# Patient Record
Sex: FEMALE | Race: WHITE
[De-identification: names, ages, dates, MRNs, and addresses within clinical notes are randomized per-mention and may not be internally consistent; named-entity substitution may affect disease eponyms.]

---

## 2017-02-23 ENCOUNTER — HOSPITAL ENCOUNTER (EMERGENCY)
Dept: HOSPITAL 62 - ER | Age: 32
Discharge: HOME | End: 2017-02-23
Payer: MEDICAID

## 2017-02-23 VITALS — SYSTOLIC BLOOD PRESSURE: 117 MMHG | DIASTOLIC BLOOD PRESSURE: 50 MMHG

## 2017-02-23 DIAGNOSIS — M54.5: ICD-10-CM

## 2017-02-23 DIAGNOSIS — R10.12: ICD-10-CM

## 2017-02-23 DIAGNOSIS — R11.0: ICD-10-CM

## 2017-02-23 DIAGNOSIS — K75.9: Primary | ICD-10-CM

## 2017-02-23 DIAGNOSIS — R63.0: ICD-10-CM

## 2017-02-23 DIAGNOSIS — R19.7: ICD-10-CM

## 2017-02-23 LAB
ALBUMIN SERPL-MCNC: 4 G/DL (ref 3.5–5)
ALP SERPL-CCNC: 221 U/L (ref 38–126)
ALT SERPL-CCNC: 1459 U/L (ref 9–52)
ANION GAP SERPL CALC-SCNC: 11 MMOL/L (ref 5–19)
APPEARANCE UR: (no result)
AST SERPL-CCNC: 1276 U/L (ref 14–36)
BASOPHILS # BLD AUTO: 0 10^3/UL (ref 0–0.2)
BASOPHILS NFR BLD AUTO: 0.7 % (ref 0–2)
BILIRUB DIRECT SERPL-MCNC: 0 MG/DL (ref 0–0.3)
BILIRUB SERPL-MCNC: 1.6 MG/DL (ref 0.2–1.3)
BILIRUB UR QL STRIP: (no result)
BUN SERPL-MCNC: 12 MG/DL (ref 7–20)
CALCIUM: 9.7 MG/DL (ref 8.4–10.2)
CHLORIDE SERPL-SCNC: 102 MMOL/L (ref 98–107)
CO2 SERPL-SCNC: 29 MMOL/L (ref 22–30)
CREAT SERPL-MCNC: 0.6 MG/DL (ref 0.52–1.25)
EOSINOPHIL # BLD AUTO: 0.2 10^3/UL (ref 0–0.6)
EOSINOPHIL NFR BLD AUTO: 3.7 % (ref 0–6)
ERYTHROCYTE [DISTWIDTH] IN BLOOD BY AUTOMATED COUNT: 14.9 % (ref 11.5–14)
GLUCOSE SERPL-MCNC: 159 MG/DL (ref 75–110)
GLUCOSE UR STRIP-MCNC: NEGATIVE MG/DL
HCT VFR BLD CALC: 39.3 % (ref 36–47)
HGB BLD-MCNC: 13.1 G/DL (ref 12–15.5)
HGB HCT DIFFERENCE: 0
KETONES UR STRIP-MCNC: NEGATIVE MG/DL
LIPASE SERPL-CCNC: 67.4 U/L (ref 23–300)
LYMPHOCYTES # BLD AUTO: 1 10^3/UL (ref 0.5–4.7)
LYMPHOCYTES NFR BLD AUTO: 23.9 % (ref 13–45)
MCH RBC QN AUTO: 27.4 PG (ref 27–33.4)
MCHC RBC AUTO-ENTMCNC: 33.3 G/DL (ref 32–36)
MCV RBC AUTO: 82 FL (ref 80–97)
MONOCYTES # BLD AUTO: 0.4 10^3/UL (ref 0.1–1.4)
MONOCYTES NFR BLD AUTO: 9.3 % (ref 3–13)
NEUTROPHILS # BLD AUTO: 2.5 10^3/UL (ref 1.7–8.2)
NEUTS SEG NFR BLD AUTO: 62.4 % (ref 42–78)
NITRITE UR QL STRIP: NEGATIVE
PH UR STRIP: 5 [PH] (ref 5–9)
POTASSIUM SERPL-SCNC: 3.7 MMOL/L (ref 3.6–5)
PROT SERPL-MCNC: 7.4 G/DL (ref 6.3–8.2)
PROT UR STRIP-MCNC: 30 MG/DL
RBC # BLD AUTO: 4.79 10^6/UL (ref 3.72–5.28)
SODIUM SERPL-SCNC: 141.6 MMOL/L (ref 137–145)
SP GR UR STRIP: 1.03
UROBILINOGEN UR-MCNC: 4 MG/DL (ref ?–2)
WBC # BLD AUTO: 4.1 10^3/UL (ref 4–10.5)

## 2017-02-23 PROCEDURE — 96372 THER/PROPH/DIAG INJ SC/IM: CPT

## 2017-02-23 PROCEDURE — 81001 URINALYSIS AUTO W/SCOPE: CPT

## 2017-02-23 PROCEDURE — 80074 ACUTE HEPATITIS PANEL: CPT

## 2017-02-23 PROCEDURE — 76705 ECHO EXAM OF ABDOMEN: CPT

## 2017-02-23 PROCEDURE — 85025 COMPLETE CBC W/AUTO DIFF WBC: CPT

## 2017-02-23 PROCEDURE — 36415 COLL VENOUS BLD VENIPUNCTURE: CPT

## 2017-02-23 PROCEDURE — 99284 EMERGENCY DEPT VISIT MOD MDM: CPT

## 2017-02-23 PROCEDURE — S0119 ONDANSETRON 4 MG: HCPCS

## 2017-02-23 PROCEDURE — 84703 CHORIONIC GONADOTROPIN ASSAY: CPT

## 2017-02-23 PROCEDURE — 83690 ASSAY OF LIPASE: CPT

## 2017-02-23 PROCEDURE — 80053 COMPREHEN METABOLIC PANEL: CPT

## 2017-02-23 NOTE — ER DOCUMENT REPORT
ED GI/





- General


Chief Complaint: Abdominal Pain


Stated Complaint: ABDOMINAL PAIN


Mode of Arrival: Ambulatory


Notes: 


Patient says that she's been experiencing back pains for the past 3-4 days.  It'

s more on the left side and comes around to the left upper quadrant.  Has been 

nauseated but not vomiting.  Has had diarrhea.   Has generalized body aches.  

Has had decreased appetite and decreased oral intake.  Urine output has also 

been decreased.  Has noticed the urine appears to be somewhat darker in color 

than normal.  Think she's had a fever as well.





Patient's LMP just finished and was only 2 days in duration when it's usually 

longer.  On no medications.  No recent illnesses or fevers.  Does not drink 

alcohol.  No surgeries.


TRAVEL OUTSIDE OF THE [place] IN LAST 30 DAYS: No





- Related Data


Allergies/Adverse Reactions: 


 





No Known Allergies Allergy (Verified 02/23/17 11:38)


 











Past Medical History





- General


Information source: Patient





- Social History


Smoking Status: Never Smoker


Chew tobacco use (# tins/day): No


Frequency of alcohol use: None


Drug Abuse: None


Family History: Reviewed & Not Pertinent, DM, Hypertension


Patient has suicidal ideation: No


Patient has homicidal ideation: No


Skin Medical History: Reports Hx MRSA - UNSURE, RECENT LABIA I & D





- Immunizations


Immunizations up to date: Yes


Hx Diphtheria, Pertussis, Tetanus Vaccination: No





Review of Systems





- Review of Systems


Notes: 


REVIEW OF SYSTEMS:


CONSTITUTIONAL : Has felt she had a fever.


EENT:   Denies eye, ear, nose or mouth or throat pain or other symptoms.


CARDIOVASCULAR:  Denies chest pain.


RESPIRATORY:  Denies cough, chest congestion, or shortness of breath.


GASTROINTESTINAL: See history of present illness.


GENITOURINARY:  Denies difficulty or painful urinating, urinary frequency, 

blood in urine.  Noted decreased urine output and darker urine specimen.


MUSCULOSKELETAL:  Denies neck pain.  Denies joint pain or swelling.


SKIN:   Denies rash or skin lesions.


NEUROLOGICAL:  Denies LOC or altered mental status.  Denies headache.  Denies 

sensory loss or motor deficits.





ALL OTHER SYSTEMS REVIEWED AND NEGATIVE.





Physical Exam





- Vital signs


Vitals: 


 











Temp Pulse Resp BP Pulse Ox


 


 97.9 F   65   20   106/61   99 


 


 02/23/17 11:17  02/23/17 11:17  02/23/17 11:17  02/23/17 11:17  02/23/17 11:17











Interpretation: Normal





- Notes


Notes: 


PHYSICAL EXAMINATION:





GENERAL: Well-appearing, in no acute distress.  Vital signs are all normal.





HEAD: Atraumatic, normocephalic.





EYES: Pupils equal round and reactive to light, extraocular movements intact.  

No scleral icterus.





ENT: oropharynx clear without exudates.  Moist mucous membranes.





NECK: Normal range of motion, supple.





LUNGS: Breath sounds clear and equal bilaterally.





HEART: Regular rate and rhythm without murmurs.





ABDOMEN: Soft, nontender.  No guarding or rebound.  No tenderness in the right 

upper quadrant.  Mild tenderness in the left upper quadrant.  No masses felt.





BACK:  No tenderness throughout entire back.





EXTREMITIES: Normal range of motion without pain.





NEUROLOGICAL:  Normal speech, normal gait.  Normal sensory, motor, and reflex 

exams.  Awake, alert, and oriented x3. 





PSYCH: Normal mood, normal affect.





SKIN: Warm, dry, no rashes.





Course





- Re-evaluation


Re-evalutation: 





02/23/17 17:27


Discussed the case with Dr. Rubio and he agrees to follow-up the patient in his 

office next week.  Likely diagnosis is viral hepatitis.





- Vital Signs


Vital signs: 


 











Temp Pulse Resp BP Pulse Ox


 


 98.5 F   59 L  16   117/50 L  98 


 


 02/23/17 15:52  02/23/17 15:52  02/23/17 15:52  02/23/17 15:52  02/23/17 15:52














- Laboratory


Result Diagrams: 


 02/23/17 11:55





 02/23/17 11:55


Laboratory results interpreted by me: 


 











  02/23/17 02/23/17 02/23/17





  11:55 11:55 11:55


 


RDW  14.9 H  


 


Glucose   159 H 


 


Total Bilirubin   1.6 H 


 


AST   1276 H 


 


ALT   1459 H 


 


Alkaline Phosphatase   221 H 


 


Urine Protein    30 H


 


Urine Bilirubin    SMALL H


 


Urine Urobilinogen    4.0 H














- Diagnostic Test


Radiology reviewed: Image reviewed, Reports reviewed - Ultrasound does not show 

any evidence of gallbladder disease.  Patient's liver is described as 

suggestive of hepatitis.





Discharge





- Discharge


Clinical Impression: 


 Hepatitis





Back pain


Qualifiers:


 Back pain location: low back pain Chronicity: acute Back pain laterality: 

bilateral Sciatica presence: unspecified whether sciatica present Qualified Code

(s): M54.5 - Low back pain





Condition: Stable


Disposition: HOME, SELF-CARE


Additional Instructions: 


Flank Pain


     We weren't able to prove an exact cause for your flank pain. Pain in the 

flank can be caused by a muscle strain or spasm. Sometimes a kidney stone 

causes pain, but can't be found on our tests. Infection in the kidney should be 

evident on a urine test. Early shingles can occasionally cause flank pain, 

without the rash that proves the diagnosis. On rare occasions, disease of the 

pancreas, aorta, spleen, or colon can create pain in the flank.


     At this time, there's no evidence of a dangerous condition, and it seems 

safe for you to be at home. If the pain goes away and does not come back, no 

further testing will be needed. If pain persists, or becomes more severe, we 

may need to repeat some tests or order additional new testing.


     Blood in the urine, urgency to urinate frequently, and pain that radiates 

to the groin can indicate a kidney stone. Fever may mean that the pain is due 

to infection, either of the kidney or the colon (diverticulitis). If your pain 

is early shingles, you should develop an eruption of blisters in the painful 

area within a few days. 


     Call the doctor or return if you have pain that is spreading or becoming 

more severe, pain that does not resolve with time, fever, or any other new 

symptoms.





ABDOMINAL PAIN


     There are many causes of abdominal pain.  Pain can mean a serious problem 

requiring surgery (such as appendicitis). It can also be an innocent problem 

that goes away on its own (such as a viral infection).  Often, time must pass 

to determine the cause of pain.


     The physician does not feel that hospitalization is necessary, at present. 

Things may change within the next 24 hours. Call the doctor or come back for re-

examination if any problems occur, such as:


     (1) Pain that becomes more severe, steady, or becomes concentrated in one 

specific area.  Also, pain that is more severe with movement or coughing.  


     (2) Vomiting that persists or becomes more frequent.  


     (3) Blood in the vomitus, urine, or bowel movements.  Blood in the stool 

may have a tarry or black appearance.


     (4) Shaking chills or fever greater than 100 degrees F. 


     (5) The abdomen becomes more distended or swollen. 


     (6) Bowel movements cease. 


     (7) Failure to improve as expected.





Hepatitis


     You likely have hepatitis.  Hepatitis is an inflammation of the liver, 

usually caused by a virus. Usually there is a week or so of malaise, fatigue, 

aches, lack of appetite, and headache, followed by nausea and vomiting, dark 

urine, and jaundice.


     Cases of hepatitis A, B, or C are usually reported to the health 

department.  Tests will determine what type of hepatitis you have:


     (1) Hepatitis B:  This type of hepatitis is not highly contagious. It is 

usually transmitted by needles or sexual contact.  The length of the illness is 

longer (a couple of months), and the chance of long-term complications is 

greater.  Special immune globulin is available for sexual partners or others 

exposed to blood or body fluids.


     (2) Hepatitis A (infectious hepatitis):  Hepatitis A is more contagious, 

but has a shorter length of illness (2-4 weeks).  Close personal contacts may 

need gamma globulin shots to prevent hepatitis.


     (3) Hepatitis C:  This type of hepatitis is passed by blood products or 

sexual contact.  The initial illness is less severe, but there is a good chance 

the hepatitis will become chronic.


     (4) Non-specific hepatitis:  Other viruses can cause hepatitis. These 

cases of hepatitis usually go away without permanent damage.


     There is no cure for hepatitis.  While you are ill, you may receive 

medication to make you more comfortable.  Do not drink alcohol, and don't take 

any drug or medication not approved by your doctor. Rest and try to eat a 

healthy diet. Use good handwashing so you don't spread the virus. Shared toys 

should be cleaned with disinfectant. Clean the toilets, sinks, and counter 

surfaces in bathrooms. Launder clothing in hot water. 


     Return or call the doctor if you develop increasing abdominal pain, severe 

vomiting, dehydration, severe weakness, or confusion.





TORADOL INJECTION:


     You have been given an injection of ketorolac tromethamine (Toradol).  

This is an excellent, safe drug for pain control.  It also has potent 

antiinflammatory action.  You should have significant pain relief within about 

one hour.


     Toradol is not addicting and is non-sedating.  It does not interfere with 

driving or work.


     Call or return if you develop itching, hives, shortness of breath, or rash.





[place] MEDICATION:


     You have been given a medication to suppress nausea and vomiting. This 

type of medication can be given as a shot, pill, or suppository. It will 

usually last for many hours.  Pills and shots usually last six to eight hours, 

suppositories last about 12 hours.  For the typical illness, only one or two 

doses of the medication may be necessary.


     Mild lightheadedness may occur.  This type of medicine can cause 

drowsiness.  Do not drive or operate dangerous machinery while under its 

influence.  Do not mix with alcohol.


     See your doctor at once if you have muscle spasms or tightness, or 

uncontrollable motions (particularly of the neck, mouth, or jaw). Persistent 

vomiting or severe lightheadedness should also be evaluated by the physician.








ORAL NARCOTIC MEDICATION:


     You have been given a prescription for pain control.  This medication is a 

narcotic.  It's best taken with food, as nausea can result if taken on an empty 

stomach.


     Don't operate machinery or drive within six hours of taking this 

medication.  Do not combine this medicine with alcohol, or with any medication 

which can cause sedation (such as cold tablets or sleeping pills) unless you 

get permission from the physician.


     Narcotics tend to cause constipation.  If possible, drink plenty of fluids 

and eat a diet high in fiber and fruits.








FOLLOW-UP CARE:


If you have been referred to a physician for follow-up care, call the physician

s office for an appointment as you were instructed or within the next two days.

  If you experience worsening or a significant change in your symptoms, notify 

the physician immediately or return to the Emergency Department at any time for 

re-evaluation.





I have spoken with Dr. Rubio, local gastroenterologist, and his office will 

contact you to schedule a follow-up appointment next week to recheck your liver 

function test and follow your case.





Prescriptions: 


Oxycodone HCl 5 - 10 mg PO Q4HP PRN #20 tablet


 PRN Reason: 


Promethazine HCl [Phenergan 25 mg Tablet] 1 - 2 tab PO Q6H PRN #15 tablet


 PRN Reason: 


Forms:  Return to Work


Referrals: 


BIJAN RUBIO MD [ACTIVE STAFF] - Follow up in 1 week

## 2017-02-23 NOTE — ER DOCUMENT REPORT
ED Medical Screen (RME)





- General


Chief Complaint: Abdominal Pain


Stated Complaint: ABDOMINAL PAIN


Mode of Arrival: Ambulatory


Information source: Patient


Notes: 


32 y/o F presents to ED c/o nausea, generalized body aches, and bilateral abd 

pain over the last 3 days. Reports associated loose stools. Denies fever, blood 

in stool or emesis.





I have greeted and performed a rapid initial assessment of this patient. A 


comprehensive ED assessment and evaluation of the patient, analysis of test 


results and completion of the medical decision making process will be conducted 


by additional ED providers. 





TRAVEL OUTSIDE OF THE [place] IN LAST 30 DAYS: No





- Related Data


Allergies/Adverse Reactions: 


 





No Known Allergies Allergy (Verified 02/23/17 11:38)


 











Past Medical History


Skin Medical History: Reports Hx MRSA - UNSURE, RECENT LABIA I & D





- Immunizations


Immunizations up to date: Yes


Hx Diphtheria, Pertussis, Tetanus Vaccination: No





Physical Exam





- Vital signs


Vitals: 





 











Temp Pulse Resp BP Pulse Ox


 


 97.9 F   65   20   106/61   99 


 


 02/23/17 11:17  02/23/17 11:17  02/23/17 11:17  02/23/17 11:17  02/23/17 11:17














- General


General appearance: Appears well, Alert


In distress: None





- Respiratory


Respiratory status: No respiratory distress





- Cardiovascular


Rhythm: Regular


Pulses: Normal: Radial


Normal capillary refill: Yes





Course





- Vital Signs


Vital signs: 





 











Temp Pulse Resp BP Pulse Ox


 


 97.9 F   65   20   106/61   99 


 


 02/23/17 11:17  02/23/17 11:17  02/23/17 11:17  02/23/17 11:17  02/23/17 11:17

## 2017-05-28 ENCOUNTER — HOSPITAL ENCOUNTER (EMERGENCY)
Dept: HOSPITAL 62 - ER | Age: 32
LOS: 1 days | Discharge: HOME | End: 2017-05-29
Payer: MEDICAID

## 2017-05-28 VITALS — SYSTOLIC BLOOD PRESSURE: 114 MMHG | DIASTOLIC BLOOD PRESSURE: 70 MMHG

## 2017-05-28 DIAGNOSIS — F17.200: ICD-10-CM

## 2017-05-28 DIAGNOSIS — T40.1X1A: Primary | ICD-10-CM

## 2017-05-28 LAB
ALBUMIN SERPL-MCNC: 4.1 G/DL (ref 3.5–5)
ALP SERPL-CCNC: 72 U/L (ref 38–126)
ALT SERPL-CCNC: 38 U/L (ref 9–52)
ANION GAP SERPL CALC-SCNC: 7 MMOL/L (ref 5–19)
AST SERPL-CCNC: 39 U/L (ref 14–36)
BASOPHILS # BLD AUTO: 0.1 10^3/UL (ref 0–0.2)
BASOPHILS NFR BLD AUTO: 0.7 % (ref 0–2)
BILIRUB DIRECT SERPL-MCNC: 0.4 MG/DL (ref 0–0.4)
BILIRUB SERPL-MCNC: 1 MG/DL (ref 0.2–1.3)
BUN SERPL-MCNC: 9 MG/DL (ref 7–20)
CALCIUM: 9.5 MG/DL (ref 8.4–10.2)
CHLORIDE SERPL-SCNC: 107 MMOL/L (ref 98–107)
CO2 SERPL-SCNC: 27 MMOL/L (ref 22–30)
CREAT SERPL-MCNC: 0.7 MG/DL (ref 0.52–1.25)
EOSINOPHIL # BLD AUTO: 0.1 10^3/UL (ref 0–0.6)
EOSINOPHIL NFR BLD AUTO: 1.8 % (ref 0–6)
ERYTHROCYTE [DISTWIDTH] IN BLOOD BY AUTOMATED COUNT: 14.7 % (ref 11.5–14)
ETHANOL SERPL-MCNC: < 10 MG/DL
GLUCOSE SERPL-MCNC: 87 MG/DL (ref 75–110)
HCT VFR BLD CALC: 35.7 % (ref 36–47)
HGB BLD-MCNC: 12 G/DL (ref 12–15.5)
HGB HCT DIFFERENCE: 0.3
LYMPHOCYTES # BLD AUTO: 1.1 10^3/UL (ref 0.5–4.7)
LYMPHOCYTES NFR BLD AUTO: 14.4 % (ref 13–45)
MCH RBC QN AUTO: 28.1 PG (ref 27–33.4)
MCHC RBC AUTO-ENTMCNC: 33.6 G/DL (ref 32–36)
MCV RBC AUTO: 84 FL (ref 80–97)
MONOCYTES # BLD AUTO: 0.5 10^3/UL (ref 0.1–1.4)
MONOCYTES NFR BLD AUTO: 6.4 % (ref 3–13)
NEUTROPHILS # BLD AUTO: 5.8 10^3/UL (ref 1.7–8.2)
NEUTS SEG NFR BLD AUTO: 76.7 % (ref 42–78)
POTASSIUM SERPL-SCNC: 4.1 MMOL/L (ref 3.6–5)
PROT SERPL-MCNC: 7.7 G/DL (ref 6.3–8.2)
RBC # BLD AUTO: 4.26 10^6/UL (ref 3.72–5.28)
SODIUM SERPL-SCNC: 141.1 MMOL/L (ref 137–145)
WBC # BLD AUTO: 7.5 10^3/UL (ref 4–10.5)

## 2017-05-28 PROCEDURE — 36415 COLL VENOUS BLD VENIPUNCTURE: CPT

## 2017-05-28 PROCEDURE — 81001 URINALYSIS AUTO W/SCOPE: CPT

## 2017-05-28 PROCEDURE — 84703 CHORIONIC GONADOTROPIN ASSAY: CPT

## 2017-05-28 PROCEDURE — 93005 ELECTROCARDIOGRAM TRACING: CPT

## 2017-05-28 PROCEDURE — 93010 ELECTROCARDIOGRAM REPORT: CPT

## 2017-05-28 PROCEDURE — 80053 COMPREHEN METABOLIC PANEL: CPT

## 2017-05-28 PROCEDURE — 80307 DRUG TEST PRSMV CHEM ANLYZR: CPT

## 2017-05-28 PROCEDURE — 99285 EMERGENCY DEPT VISIT HI MDM: CPT

## 2017-05-28 PROCEDURE — 85025 COMPLETE CBC W/AUTO DIFF WBC: CPT

## 2017-05-28 NOTE — ER DOCUMENT REPORT
ED Substance Abuse / Acc. OD





- General


Stated Complaint: OD


Time Seen by Provider: 05/28/17 20:56


Mode of Arrival: Medic


Information source: Patient


TRAVEL OUTSIDE OF THE [place] IN LAST 30 DAYS: No





- HPI


Patient complains to provider of: Accidental overdose, Drug abuse


Onset: Just prior to arrival


Onset/Duration: Sudden


Quality of pain: No pain


Overdose of: Other - Heroin


Associated Symptoms: None


Similar symptoms previously: No


Recently seen / treated by doctor: No


Notes: 


Patient is a 31-year-old female who is brought to the emergency room by EMS 

after accidental overdose, states she recreationally uses heroin, she injected 

"2 bags" to her left antecubital space today, and the next thing she knew she 

woke up to her, she feels, she denies being suicidal or homicidal, no history 

of previous overdose in the past, patient has no complaint at time of initial 

evaluation





- Related Data


Allergies/Adverse Reactions: 


 





No Known Allergies Allergy (Verified 02/23/17 11:38)


 











Past Medical History





- General


Information source: Patient





- Social History


Smoking Status: Current Every Day Smoker


Drug Abuse: Heroin, Marijuana


Family History: Reviewed & Not Pertinent, DM, Hypertension


Renal/ Medical History: Denies: Hx Peritoneal Dialysis


Skin Medical History: Reports Hx MRSA - UNSURE, RECENT LABIA I & D





- Immunizations


Immunizations up to date: Yes


Hx Diphtheria, Pertussis, Tetanus Vaccination: No





Review of Systems





- Review of Systems


Constitutional: No symptoms reported


EENT: No symptoms reported


Cardiovascular: No symptoms reported


Respiratory: No symptoms reported


Gastrointestinal: No symptoms reported


Genitourinary: No symptoms reported


Female Genitourinary: No symptoms reported


Musculoskeletal: No symptoms reported


Skin: No symptoms reported


Hematologic/Lymphatic: No symptoms reported


Neurological/Psychological: See HPI


-: Yes All other systems reviewed and negative





Physical Exam





- Vital signs


Vitals: 


 











Resp Pulse Ox


 


 22 H  92 


 


 05/28/17 20:37  05/28/17 20:37











Interpretation: Normal





- General


General appearance: Appears well, Alert





- HEENT


Head: Normocephalic, Atraumatic


Eyes: Normal


Pupils: PERRL





- Respiratory


Respiratory status: No respiratory distress


Chest status: Nontender


Breath sounds: Normal


Chest palpation: Normal





- Cardiovascular


Rhythm: Regular


Heart sounds: Normal auscultation


Murmur: No





- Abdominal


Inspection: Normal


Distension: No distension


Bowel sounds: Normal


Tenderness: Nontender


Organomegaly: No organomegaly





- Back


Back: Normal, Nontender





- Extremities


General upper extremity: Normal inspection, Nontender, Normal color, Normal ROM

, Normal temperature


General lower extremity: Normal inspection, Nontender, Normal color, Normal ROM

, Normal temperature, Normal weight bearing.  No: Janeth's sign





- Neurological


Neuro grossly intact: Yes


Cognition: Normal


Orientation: AAOx4


Colbert Coma Scale Eye Opening: Spontaneous


Jess Coma Scale Verbal: Oriented


Colbert Coma Scale Motor: Obeys Commands


Jess Coma Scale Total: 15


Speech: Normal


Motor strength normal: LUE, RUE, LLE, RLE


Sensory: Normal





- Psychological


Associated symptoms: Normal affect, Normal mood





- Skin


Skin Temperature: Warm


Skin Moisture: Dry


Skin Color: Normal





Course





- Re-evaluation


Re-evalutation: 





05/29/17 03:09


She is sleeping comfortably, easily aroused, vital signs have been stable, she 

continues to deny that this was a suicide attempt, rather it was recreational 

use of heroin, she was unaware that she may have injected too much to causing 

patient has been in the emergency room for nearly 7 hours at this point in time

, she remains stable, and will be discharged with instructions for follow-up, 

advised to stop using heroin or other illegal drugs, follow-up with her primary 

care provider or return if symptoms worsen, patient acknowledges understanding 

and agreement with this plan





- Vital Signs


Vital signs: 


 











Temp Pulse Resp BP Pulse Ox


 


       14   114/70   96 


 


       05/28/17 23:40  05/28/17 20:46  05/28/17 23:40














- Laboratory


Result Diagrams: 


 05/28/17 20:12





 05/28/17 20:12


Laboratory results interpreted by me: 


 











  05/28/17 05/28/17 05/28/17





  20:12 20:12 21:07


 


Hct  35.7 L  


 


RDW  14.7 H  


 


AST   39 H 


 


Urine Protein    100 H


 


Urine Blood    LARGE H


 


Urine Urobilinogen    4.0 H


 


Salicylates   < 1.0 L 


 


Acetaminophen   < 10 L 














- EKG Interpretation by Me


EKG shows normal: Sinus rhythm


Rate: Normal


Rhythm: NSR





Discharge





- Discharge


Clinical Impression: 


Heroin overdose


Qualifiers:


 Encounter type: initial encounter Injury intent: accidental or unintentional 

Qualified Code(s): T40.1X1A - Poisoning by heroin, accidental (unintentional), 

initial encounter





Condition: Stable


Disposition: HOME, SELF-CARE


Instructions:  Overdose ([facility]), Instructions for Home Care Following a Drug 

Overdose ([facility])


Additional Instructions: 


Stop using heroin or other illegal drugs!  Follow-up with your primary care 

provider in 1-2 days or return to the emergency room if symptoms worsen.

## 2017-05-29 LAB
APPEARANCE UR: (no result)
BARBITURATES UR QL SCN: NEGATIVE
BILIRUB UR QL STRIP: NEGATIVE
GLUCOSE UR STRIP-MCNC: NEGATIVE MG/DL
KETONES UR STRIP-MCNC: NEGATIVE MG/DL
METHADONE UR QL SCN: NEGATIVE
NITRITE UR QL STRIP: NEGATIVE
PCP UR QL SCN: NEGATIVE
PH UR STRIP: 8 [PH] (ref 5–9)
PROT UR STRIP-MCNC: 100 MG/DL
SP GR UR STRIP: 1.02
URINE OPIATES LOW: (no result)
UROBILINOGEN UR-MCNC: 4 MG/DL (ref ?–2)

## 2017-05-30 ENCOUNTER — HOSPITAL ENCOUNTER (EMERGENCY)
Dept: HOSPITAL 62 - ER | Age: 32
Discharge: HOME | End: 2017-05-30
Payer: COMMERCIAL

## 2017-05-30 VITALS — SYSTOLIC BLOOD PRESSURE: 118 MMHG | DIASTOLIC BLOOD PRESSURE: 55 MMHG

## 2017-05-30 DIAGNOSIS — F17.210: ICD-10-CM

## 2017-05-30 DIAGNOSIS — F19.10: ICD-10-CM

## 2017-05-30 DIAGNOSIS — F31.9: Primary | ICD-10-CM

## 2017-05-30 DIAGNOSIS — Z86.14: ICD-10-CM

## 2017-05-30 LAB
APPEARANCE UR: CLEAR
BARBITURATES UR QL SCN: NEGATIVE
BILIRUB UR QL STRIP: NEGATIVE
GLUCOSE UR STRIP-MCNC: NEGATIVE MG/DL
KETONES UR STRIP-MCNC: NEGATIVE MG/DL
METHADONE UR QL SCN: NEGATIVE
NITRITE UR QL STRIP: NEGATIVE
PCP UR QL SCN: NEGATIVE
PH UR STRIP: 6 [PH] (ref 5–9)
PROT UR STRIP-MCNC: NEGATIVE MG/DL
SP GR UR STRIP: 1.02
URINE OPIATES LOW: (no result)
UROBILINOGEN UR-MCNC: NEGATIVE MG/DL (ref ?–2)

## 2017-05-30 PROCEDURE — 93005 ELECTROCARDIOGRAM TRACING: CPT

## 2017-05-30 PROCEDURE — 99284 EMERGENCY DEPT VISIT MOD MDM: CPT

## 2017-05-30 PROCEDURE — 93010 ELECTROCARDIOGRAM REPORT: CPT

## 2017-05-30 PROCEDURE — 80307 DRUG TEST PRSMV CHEM ANLYZR: CPT

## 2017-05-30 PROCEDURE — 81001 URINALYSIS AUTO W/SCOPE: CPT
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
## 2017-05-30 NOTE — ER DOCUMENT REPORT
ED General





- General


Chief Complaint: Psych Problem


Stated Complaint: PSYCH EVAL


Time Seen by Provider: 05/30/17 15:33


TRAVEL OUTSIDE OF THE [place] IN LAST 30 DAYS: No





- HPI


Patient complains to provider of: Psychiatric evaluation


Notes: 


Patient is coming in for psychiatric evaluation.  Patient states she is having 

suicidal thoughts and is requesting detox.  Patient recently seen for a 

unintentional heroin overdose.  Patient states that she uses heroin very 

intermittently however has been to various places about detox.  Patient states 

that she was recently seen at [facility] prior to arrival was referred to 

[facility] today encouraged patient started medication to replace [place].  

Patient states she did not want to do this and that she does not use marijuana 

quite often.  Patient states that she is not suicidal and she placed this on 

her paperwork so that she can be seen today quicker-he has some answers that 

she is frustrated feeling that she is getting the run around.  Patient states 

today what would make her happy would go to inpatient detox.





- Related Data


Allergies/Adverse Reactions: 


 





No Known Allergies Allergy (Verified 02/23/17 11:38)


 











Past Medical History





- General


Information source: Patient, Relative - sister is bedside, [facility] Records





- Social History


Smoking Status: Current Every Day Smoker


Cigarette use (# per day): Yes


Chew tobacco use (# tins/day): No


Family History: Reviewed & Not Pertinent, DM, Hypertension


Patient has suicidal ideation: No


Patient has homicidal ideation: No


Renal/ Medical History: Denies: Hx Peritoneal Dialysis


Skin Medical History: Reports Hx MRSA - UNSURE, RECENT LABIA I & D





- Immunizations


Immunizations up to date: Yes


Hx Diphtheria, Pertussis, Tetanus Vaccination: No





Review of Systems





- Review of Systems


Constitutional: No symptoms reported


EENT: No symptoms reported


Cardiovascular: No symptoms reported


Respiratory: No symptoms reported


Gastrointestinal: No symptoms reported


Genitourinary: No symptoms reported


Female Genitourinary: No symptoms reported


Musculoskeletal: No symptoms reported


Skin: No symptoms reported


Hematologic/Lymphatic: No symptoms reported


Neurological/Psychological: Other - Upset requesting detox





Physical Exam





- Vital signs


Vitals: 





 











Temp Pulse Resp BP Pulse Ox


 


 98 F   72   16   119/73   99 


 


 05/30/17 14:28  05/30/17 14:28  05/30/17 14:28  05/30/17 14:28  05/30/17 14:28











Interpretation: Normal





- General


General appearance: Appears well, Alert





- HEENT


Head: Normocephalic, Atraumatic


Eyes: Normal


Pupils: PERRL





- Respiratory


Respiratory status: No respiratory distress


Chest status: Nontender


Breath sounds: Normal


Chest palpation: Normal





- Cardiovascular


Rhythm: Regular


Heart sounds: Normal auscultation


Murmur: No





- Abdominal


Inspection: Normal


Distension: No distension


Bowel sounds: Normal


Tenderness: Nontender


Organomegaly: No organomegaly





- Back


Back: Normal, Nontender





- Extremities


General upper extremity: Normal inspection, Nontender, Normal color, Normal ROM

, Normal temperature


General lower extremity: Normal inspection, Nontender, Normal color, Normal ROM

, Normal temperature, Normal weight bearing.  No: Janeth's sign





- Neurological


Neuro grossly intact: Yes


Cognition: Normal


Orientation: AAOx4


Jess Coma Scale Eye Opening: Spontaneous


Lowell Coma Scale Verbal: Oriented


Jess Coma Scale Motor: Obeys Commands


Jess Coma Scale Total: 15


Speech: Normal


Motor strength normal: LUE, RUE, LLE, RLE


Sensory: Normal





- Psychological


Associated symptoms: Normal affect, Normal mood





- Skin


Skin Temperature: Warm


Skin Moisture: Dry


Skin Color: Normal





Course





- Re-evaluation


Re-evalutation: 





05/30/17 17:17


Patient coming in requesting detox.  Patient denies any homicidal suicidal 

ideation.  Patient states that this was a way that she could be seen quicker.  

Patient was evaluated at mental health team.  Patient was given outpatient 

resources for her drug abuse.  At this time patient meets no criteria for IVC.  

Patient will be discharged home follow-up with primary care and resources were 

provided.





- Vital Signs


Vital signs: 





 











Temp Pulse Resp BP Pulse Ox


 


 97.7 F   69   16   118/55 L  99 


 


 05/30/17 17:09  05/30/17 17:09  05/30/17 14:32  05/30/17 17:09  05/30/17 17:09














Discharge





- Discharge


Clinical Impression: 


 Drug abuse, Bipolar 1 disorder





Condition: Stable


Disposition: HOME, SELF-CARE


Additional Instructions: 


Bipolar Disorder





     Bipolar disorder is also called manic-depressive disorder. Depression 

alternates with brain hyperactivity called driss.  Each phase lasts from 

several days to a few weeks.  We don't know exactly what causes bipolar disorder

, but it's treatable.


     During the "manic phase," you may feel elated and energetic.  You may have 

racing thoughts, rapid speech, increased activity, and grandiose ideas.  During 

this time, you may not realize how poor your judgement is.  Inappropriate 

spending, drug abuse, excessive alcohol use, marriage problems, and 

irresponsible sexual behavior are common during the manic phase.


     During the "depressive phase," you might feel depressed, guilty, worthless

, fatigued, and unable to concentrate.  You might have thoughts of suicide.


     Good treatments are available for bipolar disorder. Lithium is a classic 

drug for bipolar disorder, and is still often useful. If the manic phase is 

very mild, an antidepressant alone can be prescribed.  If the manic phase is 

very severe, an antipsychotic medicine (such as Haldol) may be needed. The 

treatment must be matched to your symptoms, so it's important to work closely 

with your psychiatric care provider.


     Contact your physician, the hospital emergency center, crisis line, or 

your counsellor if you are losing control or having self-destructive thoughts.





Please follow up with your therapist in [place]. Please continue to work with 

[facility] regarding your medications.  Please consider engaging in Narcotics 

Anonymous.  You have been provided a list of resources.  Please return if your 

symptoms worsen.


Forms:  Return to Work


Referrals: 


[facility] [Provider Group] - Follow up as needed

## 2017-10-25 ENCOUNTER — HOSPITAL ENCOUNTER (EMERGENCY)
Dept: HOSPITAL 62 - ER | Age: 32
LOS: 1 days | Discharge: HOME | End: 2017-10-26
Payer: COMMERCIAL

## 2017-10-25 DIAGNOSIS — R51: Primary | ICD-10-CM
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
## 2017-10-25 DIAGNOSIS — M25.531: ICD-10-CM

## 2017-10-25 DIAGNOSIS — M79.602: ICD-10-CM

## 2017-10-25 DIAGNOSIS — M79.601: ICD-10-CM

## 2017-10-25 DIAGNOSIS — M25.532: ICD-10-CM

## 2017-10-25 DIAGNOSIS — R07.9: ICD-10-CM

## 2017-10-25 PROCEDURE — 80307 DRUG TEST PRSMV CHEM ANLYZR: CPT

## 2017-10-25 PROCEDURE — 81025 URINE PREGNANCY TEST: CPT

## 2017-10-25 PROCEDURE — 71020: CPT

## 2017-10-25 PROCEDURE — 70450 CT HEAD/BRAIN W/O DYE: CPT

## 2017-10-25 PROCEDURE — 99284 EMERGENCY DEPT VISIT MOD MDM: CPT

## 2017-10-25 PROCEDURE — 72125 CT NECK SPINE W/O DYE: CPT

## 2017-10-25 PROCEDURE — 81001 URINALYSIS AUTO W/SCOPE: CPT

## 2017-10-26 VITALS — DIASTOLIC BLOOD PRESSURE: 70 MMHG | SYSTOLIC BLOOD PRESSURE: 124 MMHG

## 2017-10-26 LAB
APPEARANCE UR: (no result)
BARBITURATES UR QL SCN: NEGATIVE
BILIRUB UR QL STRIP: NEGATIVE
GLUCOSE UR STRIP-MCNC: NEGATIVE MG/DL
KETONES UR STRIP-MCNC: NEGATIVE MG/DL
METHADONE UR QL SCN: NEGATIVE
NITRITE UR QL STRIP: NEGATIVE
PCP UR QL SCN: NEGATIVE
PH UR STRIP: 5 [PH] (ref 5–9)
PROT UR STRIP-MCNC: 30 MG/DL
SP GR UR STRIP: 1.03
URINE OPIATES LOW: (no result)
UROBILINOGEN UR-MCNC: 2 MG/DL (ref ?–2)
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
## 2018-01-29 ENCOUNTER — HOSPITAL ENCOUNTER (EMERGENCY)
Dept: HOSPITAL 62 - ER | Age: 33
Discharge: HOME | End: 2018-01-29
Payer: SELF-PAY

## 2018-01-29 VITALS — SYSTOLIC BLOOD PRESSURE: 121 MMHG | DIASTOLIC BLOOD PRESSURE: 69 MMHG

## 2018-01-29 DIAGNOSIS — Z79.899: ICD-10-CM

## 2018-01-29 DIAGNOSIS — F41.9: ICD-10-CM

## 2018-01-29 DIAGNOSIS — T88.7XXA: Primary | ICD-10-CM

## 2018-01-29 DIAGNOSIS — Z87.891: ICD-10-CM

## 2018-01-29 PROCEDURE — 99283 EMERGENCY DEPT VISIT LOW MDM: CPT
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
## 2018-09-16 ENCOUNTER — HOSPITAL ENCOUNTER (EMERGENCY)
Dept: HOSPITAL 62 - ER | Age: 33
Discharge: LEFT BEFORE DISCHARGE | End: 2018-09-16
Payer: SELF-PAY

## 2018-09-16 VITALS — SYSTOLIC BLOOD PRESSURE: 110 MMHG | DIASTOLIC BLOOD PRESSURE: 67 MMHG

## 2018-09-16 DIAGNOSIS — Z53.21: Primary | ICD-10-CM

## 2018-12-02 ENCOUNTER — HOSPITAL ENCOUNTER (EMERGENCY)
Dept: HOSPITAL 62 - ER | Age: 33
Discharge: HOME | End: 2018-12-02
Payer: SELF-PAY

## 2018-12-02 VITALS — DIASTOLIC BLOOD PRESSURE: 79 MMHG | SYSTOLIC BLOOD PRESSURE: 122 MMHG

## 2018-12-02 DIAGNOSIS — S16.1XXA: Primary | ICD-10-CM
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
## 2018-12-02 DIAGNOSIS — M43.6: ICD-10-CM

## 2018-12-02 PROCEDURE — 99283 EMERGENCY DEPT VISIT LOW MDM: CPT

## 2018-12-02 NOTE — ER DOCUMENT REPORT
ED Neck/Back Problem





- General


Chief Complaint: Stiff Neck


Stated Complaint: NECK PAIN


Time Seen by Provider: 12/02/18 10:39


Mode of Arrival: Ambulatory


Information source: Patient


Notes: 





33-year-old female presented to ED for stiff neck with pain turning to the 

right side or movement of her neck at all.  She states it radiates down her 

arm.  She states she woke up this way.  She states Tylenol Motrin did not help 

her.  She is alert and oriented respirations regular nonlabored speaking in 

full sentences.  She states she did not injure herself she just woke up this 

way.  Patient did get moderate amount of relief with massage of the muscle.


TRAVEL OUTSIDE OF THE [place] IN LAST 30 DAYS: No





- HPI


Patient complains to provider of: Pain, Neck


Onset: Just prior to arrival


Where: Home


Onset: Sudden - Woke up with pain


Timing: Better


Quality of pain: Sharp


Severity: Severe


Pain Level: 5


Recent injury: No


Associated symptoms: Radiation to arm, Other - Pain to the neck when she woke up


Exacerbated by: Movement of neck


Relieved by: Nothing


Similar symptoms previously: Yes


Recently seen / treated by doctor: No





- Related Data


Allergies/Adverse Reactions: 


 





No Known Allergies Allergy (Verified 01/29/18 15:29)


 











Past Medical History





- General


Information source: Patient





- Social History


Smoking Status: Former Smoker


Chew tobacco use (# tins/day): No


Frequency of alcohol use: None


Drug Abuse: Marijuana


Lives with: Family


Family History: Arthritis, CAD, CVA, DM, Hyperlipidemia, Hypertension, 

Malignancy.  denies: COPD, Thyroid Disfunction


Patient has suicidal ideation: No


Patient has homicidal ideation: No





- Past Medical History


Cardiac Medical History: Reports: None


Pulmonary Medical History: Reports: None


EENT Medical History: Reports: None


Neurological Medical History: Reports: None


Endocrine Medical History: Reports: None


Renal/ Medical History: Reports: None


Malignancy Medical History: Reports: None


GI Medical History: Reports: None


Musculoskeletal Medical History: Reports Hx Musculoskeletal Trauma


Skin Medical History: Reports Hx MRSA - UNSURE, RECENT LABIA I & D


Psychiatric Medical History: Reports: Hx Attention Deficit Hyperactivity 

Disorder, Hx Bipolar Disorder, Hx Borderline Personality Disorder, Hx Depression


Traumatic Medical History: Reports: Hx Fractures - Arm


Infectious Medical History: Reports: None


Surgical Hx: Negative





- Immunizations


Immunizations up to date: Yes


Hx Diphtheria, Pertussis, Tetanus Vaccination: No





Review of Systems





- Review of Systems


Constitutional: No symptoms reported


EENT: No symptoms reported


Cardiovascular: No symptoms reported


Respiratory: No symptoms reported


Gastrointestinal: No symptoms reported


Genitourinary: No symptoms reported


Female Genitourinary: No symptoms reported


Musculoskeletal: Muscle pain, Neck pain


Skin: No symptoms reported


Hematologic/Lymphatic: No symptoms reported


Neurological/Psychological: No symptoms reported





Physical Exam





- Vital signs


Vitals: 





 











Temp Pulse Resp BP Pulse Ox


 


 98.5 F   64   16   118/77   96 


 


 12/02/18 10:25  12/02/18 10:25  12/02/18 10:25  12/02/18 10:25  12/02/18 10:25











Interpretation: Normal





- General


General appearance: Appears well, Alert





- HEENT


Head: Normocephalic, Atraumatic


Eyes: Normal


Pupils: PERRL





- Respiratory


Respiratory status: No respiratory distress


Chest status: Nontender


Breath sounds: Normal


Chest palpation: Normal





- Cardiovascular


Rhythm: Regular


Heart sounds: Normal auscultation


Murmur: No





- Abdominal


Inspection: Normal


Distension: No distension


Bowel sounds: Normal


Tenderness: Nontender


Organomegaly: No organomegaly





- Back


Back: Normal, Tender - Right back of the neck upper shoulder.





- Extremities


General upper extremity: Normal inspection, Nontender, Normal color, Normal ROM

, Normal temperature


General lower extremity: Normal inspection, Nontender, Normal color, Normal ROM

, Normal temperature, Normal weight bearing.  No: Janeth's sign





- Neurological


Neuro grossly intact: Yes


Cognition: Normal


Orientation: AAOx4


Jess Coma Scale Eye Opening: Spontaneous


Jess Coma Scale Verbal: Oriented


Jess Coma Scale Motor: Obeys Commands


[place] Coma Scale Total: 15


Speech: Normal


Motor strength normal: LUE, RUE, LLE, RLE


Sensory: Normal





- Psychological


Associated symptoms: Normal affect, Normal mood





- Skin


Skin Temperature: Warm


Skin Moisture: Dry


Skin Color: Normal





Course





- Re-evaluation


Re-evalutation: 





12/02/18 11:13


Patient had point tenderness to the back of the neck right shoulder area.  When 

massage she stated there was no longer any pain down her arm she was a move her 

neck freely.  She states that was tremendous relief.  Patient was discharged 

home with ibuprofen and Flexeril and discharged home with a prescription for 

Robaxin.  Patient was instructed to have family massage the area that was 

tender and sore.  Patient verbalized agreement with plan.





- Vital Signs


Vital signs: 





 











Temp Pulse Resp BP Pulse Ox


 


 98.5 F   64   16   118/77   96 


 


 12/02/18 10:25  12/02/18 10:25  12/02/18 10:25  12/02/18 10:25  12/02/18 10:25














Discharge





- Discharge


Clinical Impression: 


Cervical strain, acute


Qualifiers:


 Encounter type: initial encounter Qualified Code(s): S16.1XXA - Strain of 

muscle, fascia and tendon at neck level, initial encounter





Condition: Stable


Disposition: HOME, SELF-CARE


Instructions:  [facility] Phys[facility] / Practices, Ibuprofen (General) ([facility])


Additional Instructions: 


NECK INJURY (CERVICAL STRAIN):


     You have a neck strain.  This is an injury to the muscles and ligaments in 

the neck.  There is no evidence of a fracture of the neck bones.  Also, no 

injury to the spinal cord or nerve roots was detected.


     Usually, stiffness and pain INCREASE for the first 24-48 hours after the 

injury.  The pain will gradually resolve and the neck will become more mobile.  

Most patients are back at work or school within a few days.  Typically, 

complete healing takes about two or three weeks.


     The usual initial treatment is rest and cold packs.  A neck collar may be 

placed to keep the muscles of the neck at rest. Antiinflammatory and muscle 

relaxing medication are often used to reduce the spasm and irritation.


     You should call the doctor, or go to the hospital, if you develop numbness 

or weakness in any extremity, problems with your bladder or bowel, or pain 

radiating down the arms.





USE OF TYLENOL (ACETAMINOPHEN):


     Acetaminophen may be taken for pain relief or fever control. It's much 

safer than aspirin, offering a wider range of "safe" dosages.  It is safe 

during pregnancy.  Some brand names are Tylenol, Panadol, Datril, Anacin 3, 

Tempra, and Liquiprin. Acetaminophen can be repeated every four hours.  The 

following are maximum recommended dosages:





WEIGHT         Dose             Drops                  Elixir        Chewable(

80mg)


(LBS.)                            drprs=droppers    tsp=teaspoon


6               40 mg            0.4 ml (1/2)


6-11            80 mg            0.8 ml (full)              tsp               

   1       tab


12-16         120 mg           1 1/2 drprs             3/4  tsp               1 

1/2  tabs


17-23         160 mg             2  drprs             1    tsp                 

  2       tabs


24-30         240 mg             3  drprs             1 1/2 tsp                

3       tabs


30-35         320 mg                                       2    tsp            

       4       tabs


36-41         360 mg                                       2 1/4   tsp         

     4 1/2 tabs


42-47         400 mg                                       2 1/2   tsp         

     5      tabs


48-53         480 mg                                       3    tsp            

       6      tabs


54-59         520 mg                                       3  1/4  tsp         

     6 1/2 tabs


60-64         560 mg                                       3  1/2  tsp         

     7      tabs 


65-70         600 mg                                       3  3/4  tsp         

     7 1/2 tabs


71-76         640 mg                                       4   tsp             

      8      tabs


77-82         720 mg                                       4 1/2   tsp         

    9      tabs


83-88         800 mg                                       5   tsp             

    10      tabs





>89 pounds or adults          650 mg to 900 mg





Acetaminophen can be repeated every four hours.  Maximum dose not to exceed 

4000 mg a day.





   These maximum recommended dosages are slightly higher than the dosages 

written on the product container, but these dosages are very safe and below the 

toxic dosage for acetaminophen.








ICE PACKS:


     Apply ice packs frequently against the painful area.  Many different 

schedules are recommended, such as "20 minutes on, 20 minutes off" or "one hour 

ice, two hours rest."  If you need to work, you may need to go longer between 

ice treatments.  You should plan to have the area ice packed AT LEAST one 

fourth of the time.


     The ice should be applied over the wrap, tape, or splint, or over a layer 

of cloth -- not directly against the skin.  Some ice bags have a built-in cloth 

and can be put directly on the skin.





WARM PACKS:


     After approximately two days, apply gentle heat (such as a heating pad or 

hot water bottle) for about 20 to 30 minutes about every two hours -- at least 

four times daily.  Warmth and elevation will help you make a more rapid recovery

, and will ease the pain considerably.


     Do not use HOT heat, and never apply heat for longer than 30 minutes.  The 

continuous heat can invisibly damage skin and muscles -- even when no burn is 

seen on the surface.  Damaged muscles can make you MORE sore.








MUSCLE RELAXERS: 


     Muscle relaxing medications are usually prescribed for acute muscle spasm 

or injury to the neck and back.  They are often combined with antiinflammatory 

pain medication for increased relief.


     You may stop the muscle relaxer when the pain and stiffness have improved.

  Start the medication again if spasms recur.  


     Muscle relaxers may cause drowsiness, especially with the first dose.  Do 

not operate machinery or drive while under the effects of the medication.  Most 

muscle relaxers last up to 24 hours.  Do not combine the medication with 

alcohol.








FOLLOW-UP CARE:


If you have been referred to a physician for follow-up care, call the physician

s office for an appointment as you were instructed or within the next two days.

  If you experience worsening or a significant change in your symptoms, notify 

the physician immediately or return to the Emergency Department at any time for 

re-evaluation.


Prescriptions: 


Methocarbamol [Robaxin 500 mg Tablet] 500 mg PO BIDP PRN #14 tablet


 PRN Reason: For Pain Scale 3-4

## 2019-01-12 ENCOUNTER — HOSPITAL ENCOUNTER (EMERGENCY)
Dept: HOSPITAL 62 - ER | Age: 34
Discharge: HOME | End: 2019-01-12
Payer: SELF-PAY

## 2019-01-12 VITALS — DIASTOLIC BLOOD PRESSURE: 69 MMHG | SYSTOLIC BLOOD PRESSURE: 129 MMHG

## 2019-01-12 DIAGNOSIS — R22.0: ICD-10-CM

## 2019-01-12 DIAGNOSIS — L50.9: Primary | ICD-10-CM

## 2019-01-12 DIAGNOSIS — Z87.891: ICD-10-CM

## 2019-01-12 PROCEDURE — 96372 THER/PROPH/DIAG INJ SC/IM: CPT

## 2019-01-12 PROCEDURE — 99283 EMERGENCY DEPT VISIT LOW MDM: CPT

## 2019-01-12 NOTE — ER DOCUMENT REPORT
ED Medical Screen (RME)





- General


Chief Complaint: Allergic Reaction


Stated Complaint: EYE PROBLEM


Time Seen by Provider: 01/12/19 10:29


Mode of Arrival: Ambulatory


Information source: Patient, Critical access hospital Records


Notes: 





33-year-old female patient reports onset yesterday of swelling to her lips, that

is better today.  Today she has swelling to her eyes and hands with redness and 

itching.  The swelling is somewhat improved with Benadryl, but it did not change

the itching.


She is not on any regular medications at this time, she has never had a reaction

like this before.  There are no suspicious medications, foods, or bathing or 

cleaning chemicals.


TRAVEL OUTSIDE OF THE [place] IN LAST 30 DAYS: No





- Related Data


Allergies/Adverse Reactions: 


                                        





No Known Allergies Allergy (Verified 01/12/19 10:34)


   











Past Medical History





- General


Information source: Patient, Critical access hospital Records





- Social History


Cigarette use (# per day): No - Former smoker


Chew tobacco use (# tins/day): No


Frequency of alcohol use: None


Drug Abuse: Marijuana, Other - Former heroin abuse


Occupation: Unemployed


Lives with: Friend


Family history: Reviewed & Not Pertinent


Musculoskeltal Medical History: Reports Hx Musculoskeletal Trauma


Skin Medical History: Reports Hx MRSA - UNSURE, RECENT LABIA I & D


Psychiatric Medical History: Reports: Hx Attention Deficit Hyperactivity 

Disorder, Hx Bipolar Disorder, Hx Borderline Personality Disorder, Hx Depression


Traumatic Medical History: Reports: Hx Fractures - Arm


Surgical Hx: Negative





- Immunizations


Immunizations up to date: Yes


Hx Diphtheria, Pertussis, Tetanus Vaccination: No





Review of Systems





- Review of Systems


Constitutional: No symptoms reported


EENT: No symptoms reported


Cardiovascular: No symptoms reported


Respiratory: No symptoms reported


Gastrointestinal: No symptoms reported


Genitourinary: No symptoms reported


Female Genitourinary: Last menstrual period - 12/21/2018


Musculoskeletal: No symptoms reported


Skin: See HPI


Hematologic/Lymphatic: No symptoms reported


Neurological/Psychological: No symptoms reported





Physical Exam





- Vital signs


Vitals: 


                                        











Temp Pulse Resp BP Pulse Ox


 


 98.5 F   75   18   117/84   100 


 


 01/12/19 10:17  01/12/19 10:17  01/12/19 10:17  01/12/19 10:17  01/12/19 10:17











Interpretation: Normal





- General


General appearance: Appears well, Alert


In distress: Mild





- HEENT


Head: Normocephalic, Atraumatic


Eyes: Other - There is some periorbital erythema predominantly infraorbital, has

the appearance of a Nory dermatitis type rash secondary to rubbing the eyes.  

There is some edema to the eyelids. Conjunctiva are normal appearing


Pupils: PERRL


Nasal: Normal


Mouth/Lips: Normal


Mucous membranes: Normal


Pharynx: Normal


Neck: Normal





- Respiratory


Respiratory status: No respiratory distress


Breath sounds: Normal





- Cardiovascular


Rhythm: Regular


Heart sounds: Normal auscultation


Murmur: No





- Abdominal


Inspection: Normal





- Back


Back: Normal





- Extremities


General upper extremity: Other - The palms are erythematous


General lower extremity: Normal inspection





- Neurological


Neuro grossly intact: Yes





- Psychological


Associated symptoms: Normal affect, Normal mood





- Skin


Skin Temperature: Warm


Skin Moisture: Dry


Skin Color: Normal


Notes: 





There is infraorbital erythema with a dry appearance to the skin like a contact 

rash.  There is dermatographia some demonstrated on both volar forearms.  There 

is erythema to the palms.  The patient is trying to refrain from scratching.





Course





- Re-evaluation


Re-evalutation: 





01/12/19 11:57


Patient's palmar erythema has faded.  There is still some pruritus but it is 

much better.  The edema to the lateral upper eyelids continues, but the erythema

in the infraorbital area has mostly cleared.





- Vital Signs


Vital signs: 


                                        











Temp Pulse Resp BP Pulse Ox


 


 98.4 F   66   18   129/69 H  100 


 


 01/12/19 11:52  01/12/19 11:52  01/12/19 11:52  01/12/19 11:52  01/12/19 11:52














Doctor's Discharge





- Discharge


Clinical Impression: 


 Urticaria





Condition: Stable


Disposition: HOME, SELF-CARE


Additional Instructions: 


Acute Allergic Reaction





   Your symptoms are due to an allergic reaction.  Allergy can cause hives, 

swelling of the hands, feet, and face, hoarseness, and difficulty swallowing or 

breathing.  It may be due to exposure to medication, animal dander, foods, 

infection, or insect bites.  Medication is a common cause, even when prior use 

of this same medication caused no problems. 


   Acute treatment may include adrenalin and antihistamines.  Usually, the 

specific allergic agent can't be identified unless repeated episodes occur.


   Home treatment includes the following:


   (1) Stop any suspicious medications.  This will be discussed with you.


   (2) Oral antihistamines for the next four to five days.  Example, 

diphenhydramine (Benadryl) every four hours.


   (3) You may also use cimetidine (Tagamet), ranitidine (Zantac), or famotidine

(Pepcid) every four hours if diphenhydramine is not controlling itching and 

hives.


   (4) Avoid aspirin until the hives completely disappear.


   (5) Avoid hot bahs or showers until the hives are completely gone. 


   Call the doctor if faintness, difficulty swallowing, tightness in the chest, 

or wheezing occurs.


Forms:  Return to Work

## 2019-09-29 ENCOUNTER — HOSPITAL ENCOUNTER (EMERGENCY)
Dept: HOSPITAL 62 - ER | Age: 34
Discharge: HOME | End: 2019-09-29
Payer: SELF-PAY

## 2019-09-29 VITALS — DIASTOLIC BLOOD PRESSURE: 71 MMHG | SYSTOLIC BLOOD PRESSURE: 118 MMHG

## 2019-09-29 DIAGNOSIS — R53.83: ICD-10-CM

## 2019-09-29 DIAGNOSIS — R61: ICD-10-CM

## 2019-09-29 DIAGNOSIS — K75.9: ICD-10-CM

## 2019-09-29 DIAGNOSIS — R11.2: ICD-10-CM

## 2019-09-29 DIAGNOSIS — M79.10: ICD-10-CM

## 2019-09-29 DIAGNOSIS — M25.50: ICD-10-CM

## 2019-09-29 DIAGNOSIS — R51: ICD-10-CM

## 2019-09-29 DIAGNOSIS — B34.9: Primary | ICD-10-CM

## 2019-09-29 LAB
ABSOLUTE LYMPHOCYTES# (MANUAL): 0.7 10^3/UL (ref 0.5–4.7)
ABSOLUTE MONOCYTES # (MANUAL): 0.1 10^3/UL (ref 0.1–1.4)
ADD MANUAL DIFF: YES
ALBUMIN SERPL-MCNC: 4.2 G/DL (ref 3.5–5)
ALP SERPL-CCNC: 159 U/L (ref 38–126)
ANION GAP SERPL CALC-SCNC: 9 MMOL/L (ref 5–19)
ANISOCYTOSIS BLD QL SMEAR: SLIGHT
APPEARANCE UR: (no result)
APTT PPP: YELLOW S
AST SERPL-CCNC: 658 U/L (ref 14–36)
BASOPHILS NFR BLD MANUAL: 0 % (ref 0–2)
BILIRUB DIRECT SERPL-MCNC: 0.3 MG/DL (ref 0–0.4)
BILIRUB SERPL-MCNC: 1.6 MG/DL (ref 0.2–1.3)
BILIRUB UR QL STRIP: NEGATIVE
BUN SERPL-MCNC: 9 MG/DL (ref 7–20)
CALCIUM: 9.6 MG/DL (ref 8.4–10.2)
CHLORIDE SERPL-SCNC: 99 MMOL/L (ref 98–107)
CO2 SERPL-SCNC: 29 MMOL/L (ref 22–30)
EOSINOPHIL NFR BLD MANUAL: 3 % (ref 0–6)
ERYTHROCYTE [DISTWIDTH] IN BLOOD BY AUTOMATED COUNT: 14.2 % (ref 11.5–14)
GLUCOSE SERPL-MCNC: 103 MG/DL (ref 75–110)
GLUCOSE UR STRIP-MCNC: NEGATIVE MG/DL
HCT VFR BLD CALC: 40 % (ref 36–47)
HGB BLD-MCNC: 13.4 G/DL (ref 12–15.5)
KETONES UR STRIP-MCNC: NEGATIVE MG/DL
MCH RBC QN AUTO: 27.9 PG (ref 27–33.4)
MCHC RBC AUTO-ENTMCNC: 33.6 G/DL (ref 32–36)
MCV RBC AUTO: 83 FL (ref 80–97)
MONOCYTES % (MANUAL): 2 % (ref 3–13)
NITRITE UR QL STRIP: NEGATIVE
PH UR STRIP: 8 [PH] (ref 5–9)
PLATELET # BLD: 149 10^3/UL (ref 150–450)
PLATELET COMMENT: (no result)
POTASSIUM SERPL-SCNC: 4.1 MMOL/L (ref 3.6–5)
PROT SERPL-MCNC: 7.6 G/DL (ref 6.3–8.2)
PROT UR STRIP-MCNC: 30 MG/DL
RBC # BLD AUTO: 4.82 10^6/UL (ref 3.72–5.28)
SEGMENTED NEUTROPHILS % (MAN): 82 % (ref 42–78)
SP GR UR STRIP: 1.02
TOTAL CELLS COUNTED BLD: 100
UROBILINOGEN UR-MCNC: 4 MG/DL (ref ?–2)
VARIANT LYMPHS NFR BLD MANUAL: 13 % (ref 13–45)
WBC # BLD AUTO: 5.1 10^3/UL (ref 4–10.5)

## 2019-09-29 PROCEDURE — 76705 ECHO EXAM OF ABDOMEN: CPT

## 2019-09-29 PROCEDURE — 96375 TX/PRO/DX INJ NEW DRUG ADDON: CPT

## 2019-09-29 PROCEDURE — 96361 HYDRATE IV INFUSION ADD-ON: CPT

## 2019-09-29 PROCEDURE — 80053 COMPREHEN METABOLIC PANEL: CPT

## 2019-09-29 PROCEDURE — 81001 URINALYSIS AUTO W/SCOPE: CPT

## 2019-09-29 PROCEDURE — 99284 EMERGENCY DEPT VISIT MOD MDM: CPT

## 2019-09-29 PROCEDURE — 83690 ASSAY OF LIPASE: CPT

## 2019-09-29 PROCEDURE — 80074 ACUTE HEPATITIS PANEL: CPT

## 2019-09-29 PROCEDURE — 36415 COLL VENOUS BLD VENIPUNCTURE: CPT

## 2019-09-29 PROCEDURE — 96374 THER/PROPH/DIAG INJ IV PUSH: CPT

## 2019-09-29 PROCEDURE — 84703 CHORIONIC GONADOTROPIN ASSAY: CPT

## 2019-09-29 PROCEDURE — 85025 COMPLETE CBC W/AUTO DIFF WBC: CPT

## 2019-09-29 PROCEDURE — 80307 DRUG TEST PRSMV CHEM ANLYZR: CPT

## 2019-09-29 NOTE — ER DOCUMENT REPORT
ED Medical Screen (RME)





- General


Chief Complaint: Nausea/Vomiting


Stated Complaint: VOMITING


Time Seen by Provider: 09/29/19 09:57


Mode of Arrival: Ambulatory


Information source: Patient


Notes: 





Patient presents complaining of nausea and vomiting started yesterday.  Patient 

complains of generalized abdominal tenderness, headache and low back pain.  

Patient denies any fever.  Patient denies any urinary symptoms.





hx: Anxiety, depression, bipolar





I have greeted and performed a rapid initial assessment of this patient.  A 

comprehensive ED assessment and evaluation of the patient, analysis of test 

results and completion of the medical decision making process will be conducted 

by additional ED providers.


TRAVEL OUTSIDE OF THE [place] IN LAST 30 DAYS: No





- Related Data


Allergies/Adverse Reactions: 


                                        





No Known Allergies Allergy (Verified 09/29/19 09:48)


   











Past Medical History





- Social History


Chew tobacco use (# tins/day): No


Frequency of alcohol use: None


Drug Abuse: None


Family history: Reviewed & Not Pertinent


Renal/ Medical History: Denies: Hx Peritoneal Dialysis


Musculoskeltal Medical History: Reports Hx Musculoskeletal Trauma


Skin Medical History: Reports Hx MRSA - UNSURE, RECENT LABIA I & D


Psychiatric Medical History: Reports: Hx Attention Deficit Hyperactivity Dis

order, Hx Bipolar Disorder, Hx Borderline Personality Disorder, Hx Depression


Traumatic Medical History: Reports: Hx Fractures - Arm





- Immunizations


Immunizations up to date: Yes


Hx Diphtheria, Pertussis, Tetanus Vaccination: No





Physical Exam





- Vital signs


Vitals: 





                                        











Temp Pulse Resp BP Pulse Ox


 


 98.9 F   100   16   112/60   96 


 


 09/29/19 09:36  09/29/19 09:36  09/29/19 09:36  09/29/19 09:36  09/29/19 09:36














- General


Notes: 





Generalized abdominal tenderness, no CVA tenderness





Course





- Vital Signs


Vital signs: 





                                        











Temp Pulse Resp BP Pulse Ox


 


 98.9 F   100   16   112/60   96 


 


 09/29/19 09:36  09/29/19 09:36  09/29/19 09:36  09/29/19 09:36  09/29/19 09:36

## 2019-09-29 NOTE — ER DOCUMENT REPORT
ED General





- General


Chief Complaint: Nausea/Vomiting


Stated Complaint: VOMITING


Time Seen by Provider: 09/29/19 09:57


Mode of Arrival: Ambulatory


Notes: 





34-year-old female presents emergency department complaining of headache, 

nausea, vomiting, feeling fatigue, myalgias and joint pains.  Patient states 

that she has not been having any abdominal pain or diarrhea.  Denies any fevers 

or dysuria.  Denies drinking alcohol or taking Tylenol.  States that she felt 

similar a few years ago and was told she had a liver problem.  She thinks that 

she was told that she is a carrier for hepatitis C.








TRAVEL OUTSIDE OF THE [place] IN LAST 30 DAYS: No





- Related Data


Allergies/Adverse Reactions: 


                                        





No Known Allergies Allergy (Verified 09/29/19 09:48)


   











Past Medical History





- General


Information source: Patient





- Social History


Smoking Status: Never Smoker


Chew tobacco use (# tins/day): No


Frequency of alcohol use: None


Drug Abuse: None


Family History: Arthritis, CAD, CVA, DM, Hyperlipidemia, Hypertension, 

Malignancy.  denies: COPD, Thyroid Disfunction


Patient has suicidal ideation: No


Patient has homicidal ideation: No


Renal/ Medical History: Denies: Hx Peritoneal Dialysis


Musculoskeletal Medical History: Reports Hx Musculoskeletal Trauma


Skin Medical History: Reports Hx MRSA - UNSURE, RECENT LABIA I & D


Psychiatric Medical History: Reports: Hx Attention Deficit Hyperactivity 

Disorder, Hx Bipolar Disorder, Hx Borderline Personality Disorder, Hx Depression


Traumatic Medical History: Reports: Hx Fractures - Arm





- Immunizations


Immunizations up to date: Yes


Hx Diphtheria, Pertussis, Tetanus Vaccination: No





Review of Systems





- Review of Systems


Constitutional: See HPI, Diaphoresis.  denies: Chills


EENT: No symptoms reported


Cardiovascular: No symptoms reported


Respiratory: No symptoms reported


Gastrointestinal: See HPI, Nausea, Vomiting.  denies: Abdomen distended, 

Abdominal pain, Diarrhea


Musculoskeletal: See HPI


Neurological/Psychological: See HPI


-: Yes All other systems reviewed and negative





Physical Exam





- Vital signs


Vitals: 


                                        











Temp Pulse Resp BP Pulse Ox


 


 98.9 F   100   16   112/60   96 


 


 09/29/19 09:36  09/29/19 09:36  09/29/19 09:36  09/29/19 09:36  09/29/19 09:36











Interpretation: Normal





- Notes


Notes: 





GENERAL: Alert, interacts well.  No acute distress.


HEAD: Normocephalic, atraumatic


EYES: Pupils equal, round and reactive to light, extraocular movements intact.


ENT: Oral mucosa moist, tongue midline. 


NECK: Full range of motion, supple, trachea midline.


LUNGS: Clear to auscultation bilaterally, no wheezes, rales or rhonchi, no 

respiratory distress.


HEART: Regular rate and rhythm, no murmurs, gallops, rubs.  


ABDOMEN: Soft, nontender, nondistended, bowel sounds present in all 4 quadrants.

 


EXTREMITIES: Moves all 4 extremities spontaneously, no edema, radial and 

dorsalis pedis pulses 2/4 bilaterally.  No cyanosis.  


NEUROLOGICAL: Alert and oriented x3, normal speech, no facial droop, biceps and 

patellar DTRs 2+ bilaterally.


PSYCH: Normal mood, normal affect.


SKIN: Warm, Dry, normal turgor, no rashes or lesions noted.





Course





- Re-evaluation


Re-evalutation: 





09/29/19 16:11


CBC shows slight low platelets at 149, CMP shows slightly elevated total 

bilirubin at 1.6, elevated AST, ALT and alk phos.  Lipase normal, pregnancy test

negative, urinalysis appears to be contaminated with small leukocyte esterase 

with 25 squamous epithelial cells.  Acetaminophen level is undetectable.  

Patient denies drinking.  Abdominal ultrasound is unremarkable.  Hepatitis panel

is pending and she will be referred to a primary care doc as an outpatient for 

further follow-up.  Instructed not to take acetaminophen or drink alcohol until 

she gets her LFTs rechecked.





Discharged home with Zofran and Phenergan.





- Vital Signs


Vital signs: 


                                        











Temp Pulse Resp BP Pulse Ox


 


 98.9 F   100   16   112/60   96 


 


 09/29/19 09:36  09/29/19 09:36  09/29/19 09:36  09/29/19 09:36  09/29/19 09:36














- Laboratory


Result Diagrams: 


                                 09/29/19 10:28





                                 09/29/19 10:28


Laboratory results interpreted by me: 


                                        











  09/29/19 09/29/19 09/29/19





  10:28 10:28 10:28


 


RDW  14.2 H  


 


Plt Count  149 L  


 


Seg Neuts % (Manual)  82 H  


 


Monocytes % (Manual)  2 L  


 


Sodium   136.7 L 


 


Total Bilirubin   1.6 H 


 


AST   658 H 


 


Alkaline Phosphatase   159 H 


 


Urine Protein    30 H


 


Urine Urobilinogen    4.0 H


 


Ur Leukocyte Esterase    SMALL H


 


Acetaminophen   














  09/29/19





  10:28


 


RDW 


 


Plt Count 


 


Seg Neuts % (Manual) 


 


Monocytes % (Manual) 


 


Sodium 


 


Total Bilirubin 


 


AST 


 


Alkaline Phosphatase 


 


Urine Protein 


 


Urine Urobilinogen 


 


Ur Leukocyte Esterase 


 


Acetaminophen  < 10 L














Discharge





- Discharge


Clinical Impression: 


 Viral syndrome, Hepatitis





Condition: Stable


Disposition: HOME, SELF-CARE


Additional Instructions: 


Your ultrasound today was normal.  I do not know what exactly is causing the 

elevation in your liver enzymes.  Will be very important to have these rechecked

in 1 to 2 weeks.  I have checked a hepatitis panel but that will not come back 

today.  I will need you to follow-up with a primary care physician to find out 

these results.  I have referred you to Dr. June Moreno who is the primary care 

physician on call.  I have also prescribed you Zofran and Phenergan which are 

antinausea medications.  You may take ibuprofen for pain and muscle aches.  Do 

not take acetaminophen/Tylenol.


Prescriptions: 


Promethazine HCl [Phenergan 25 mg Tablet] 1 - 2 tab PO Q6H PRN #15 tablet


 PRN Reason: 


Ondansetron [Zofran Odt 4 mg Tablet] 1 - 2 tab PO Q4H PRN #15 tab.rapdis


 PRN Reason: For Nausea/Vomiting


Referrals: 


HECTOR MORENO MD [ACTIVE STAFF] - Follow up in 1 week

## 2019-09-29 NOTE — RADIOLOGY REPORT (SQ)
EXAM DESCRIPTION:  U/S ABDOMEN LIMITED W/O DOP



COMPLETED DATE/TIME:  9/29/2019 3:20 pm



REASON FOR STUDY:  elevated LFTs



COMPARISON:  Right upper quadrant ultrasound 2/23/2017



TECHNIQUE:  Dynamic and static grayscale images acquired of the abdomen and recorded on PACS. Additio
nal selected color Doppler and spectral images recorded.



LIMITATIONS:  Upper abdominal bowel gas, body habitus



FINDINGS:  PANCREAS: Midline pancreas unremarkable

LIVER: No masses. Echotexture grossly normal.

LIVER VASCULATURE: Normal directional flow of the main portal vein and hepatic veins.

GALLBLADDER: No stones. Normal wall thickness. No pericholecystic fluid.

ULTRASOUND-DETECTED VEE'S SIGN: Negative.

INTRAHEPATIC DUCTS AND COMMON DUCT: CBD and intrahepatic ducts normal caliber. No filling defects.  D
istal most common duct not well seen due to duodenum gas

INFERIOR VENA CAVA: Normal flow.

AORTA: No aneurysm.

RIGHT KIDNEY:  Normal size. Normal echogenicity. No solid or suspicious masses. No hydronephrosis. No
 calcifications.

PERITONEAL AND RIGHT PLEURAL SPACE: No ascites or effusions.

OTHER: No other significant findings.



IMPRESSION:  Limited negative study



TECHNICAL DOCUMENTATION:  JOB ID:  [id number]

 2011 [employer]- All Rights Reserved



Reading location - IP/workstation name: [facility]

## 2019-10-01 LAB — HEPATITIS C VIRUS ANTIBODY: >11 S/CO RATIO (ref 0–0.9)

## 2019-10-23 ENCOUNTER — HOSPITAL ENCOUNTER (OUTPATIENT)
Dept: HOSPITAL 62 - ER | Age: 34
Setting detail: OBSERVATION
LOS: 2 days | Discharge: HOME | End: 2019-10-25
Attending: SURGERY
Payer: SELF-PAY

## 2019-10-23 DIAGNOSIS — R53.1: ICD-10-CM

## 2019-10-23 DIAGNOSIS — R50.9: ICD-10-CM

## 2019-10-23 DIAGNOSIS — F17.200: ICD-10-CM

## 2019-10-23 DIAGNOSIS — R53.81: ICD-10-CM

## 2019-10-23 DIAGNOSIS — L02.414: Primary | ICD-10-CM

## 2019-10-23 DIAGNOSIS — Z86.14: ICD-10-CM

## 2019-10-23 DIAGNOSIS — F19.10: ICD-10-CM

## 2019-10-23 DIAGNOSIS — R20.2: ICD-10-CM

## 2019-10-23 LAB
ADD MANUAL DIFF: NO
ALBUMIN SERPL-MCNC: 4 G/DL (ref 3.5–5)
ALP SERPL-CCNC: 106 U/L (ref 38–126)
ANION GAP SERPL CALC-SCNC: 12 MMOL/L (ref 5–19)
AST SERPL-CCNC: 31 U/L (ref 14–36)
BASOPHILS # BLD AUTO: 0.1 10^3/UL (ref 0–0.2)
BASOPHILS NFR BLD AUTO: 0.6 % (ref 0–2)
BILIRUB DIRECT SERPL-MCNC: 0.2 MG/DL (ref 0–0.4)
BILIRUB SERPL-MCNC: 0.5 MG/DL (ref 0.2–1.3)
BUN SERPL-MCNC: 13 MG/DL (ref 7–20)
CALCIUM: 9.5 MG/DL (ref 8.4–10.2)
CHLORIDE SERPL-SCNC: 104 MMOL/L (ref 98–107)
CO2 SERPL-SCNC: 25 MMOL/L (ref 22–30)
EOSINOPHIL # BLD AUTO: 0.2 10^3/UL (ref 0–0.6)
EOSINOPHIL NFR BLD AUTO: 2.1 % (ref 0–6)
ERYTHROCYTE [DISTWIDTH] IN BLOOD BY AUTOMATED COUNT: 14.4 % (ref 11.5–14)
GLUCOSE SERPL-MCNC: 104 MG/DL (ref 75–110)
HCT VFR BLD CALC: 38 % (ref 36–47)
HGB BLD-MCNC: 12.9 G/DL (ref 12–15.5)
LYMPHOCYTES # BLD AUTO: 1 10^3/UL (ref 0.5–4.7)
LYMPHOCYTES NFR BLD AUTO: 11.1 % (ref 13–45)
MCH RBC QN AUTO: 27.9 PG (ref 27–33.4)
MCHC RBC AUTO-ENTMCNC: 33.8 G/DL (ref 32–36)
MCV RBC AUTO: 83 FL (ref 80–97)
MONOCYTES # BLD AUTO: 0.9 10^3/UL (ref 0.1–1.4)
MONOCYTES NFR BLD AUTO: 9.8 % (ref 3–13)
NEUTROPHILS # BLD AUTO: 7.2 10^3/UL (ref 1.7–8.2)
NEUTS SEG NFR BLD AUTO: 76.4 % (ref 42–78)
PLATELET # BLD: 247 10^3/UL (ref 150–450)
POTASSIUM SERPL-SCNC: 4 MMOL/L (ref 3.6–5)
PROT SERPL-MCNC: 7.7 G/DL (ref 6.3–8.2)
RBC # BLD AUTO: 4.6 10^6/UL (ref 3.72–5.28)
TOTAL CELLS COUNTED % (AUTO): 100 %
WBC # BLD AUTO: 9.4 10^3/UL (ref 4–10.5)

## 2019-10-23 PROCEDURE — 0J9F0ZZ DRAINAGE OF LEFT UPPER ARM SUBCUTANEOUS TISSUE AND FASCIA, OPEN APPROACH: ICD-10-PCS | Performed by: SURGERY

## 2019-10-23 PROCEDURE — 80053 COMPREHEN METABOLIC PANEL: CPT

## 2019-10-23 PROCEDURE — 01810 ANES PX NRV MUSC F/ARM WRST: CPT

## 2019-10-23 PROCEDURE — 10060 I&D ABSCESS SIMPLE/SINGLE: CPT

## 2019-10-23 PROCEDURE — 96374 THER/PROPH/DIAG INJ IV PUSH: CPT

## 2019-10-23 PROCEDURE — A6266 IMPREG GAUZE NO H20/SAL/YARD: HCPCS

## 2019-10-23 PROCEDURE — 85025 COMPLETE CBC W/AUTO DIFF WBC: CPT

## 2019-10-23 PROCEDURE — 87070 CULTURE OTHR SPECIMN AEROBIC: CPT

## 2019-10-23 PROCEDURE — G0378 HOSPITAL OBSERVATION PER HR: HCPCS

## 2019-10-23 PROCEDURE — 87205 SMEAR GRAM STAIN: CPT

## 2019-10-23 PROCEDURE — 87186 SC STD MICRODIL/AGAR DIL: CPT

## 2019-10-23 PROCEDURE — 87077 CULTURE AEROBIC IDENTIFY: CPT

## 2019-10-23 PROCEDURE — 99285 EMERGENCY DEPT VISIT HI MDM: CPT

## 2019-10-23 PROCEDURE — 96361 HYDRATE IV INFUSION ADD-ON: CPT

## 2019-10-23 PROCEDURE — 36415 COLL VENOUS BLD VENIPUNCTURE: CPT

## 2019-10-23 PROCEDURE — 96376 TX/PRO/DX INJ SAME DRUG ADON: CPT

## 2019-10-23 PROCEDURE — 87075 CULTR BACTERIA EXCEPT BLOOD: CPT

## 2019-10-23 PROCEDURE — 80202 ASSAY OF VANCOMYCIN: CPT

## 2019-10-23 PROCEDURE — 84703 CHORIONIC GONADOTROPIN ASSAY: CPT

## 2019-10-23 PROCEDURE — 76882 US LMTD JT/FCL EVL NVASC XTR: CPT

## 2019-10-23 RX ADMIN — PIPERACILLIN AND TAZOBACTAM SCH: 3; .375 INJECTION, POWDER, LYOPHILIZED, FOR SOLUTION INTRAVENOUS; PARENTERAL at 18:50

## 2019-10-23 RX ADMIN — HYDROCODONE BITARTRATE AND ACETAMINOPHEN PRN TAB: 10; 325 TABLET ORAL at 23:02

## 2019-10-23 RX ADMIN — MORPHINE SULFATE PRN MG: 10 INJECTION INTRAMUSCULAR; INTRAVENOUS; SUBCUTANEOUS at 19:46

## 2019-10-23 RX ADMIN — VANCOMYCIN HYDROCHLORIDE SCH MLS/HR: 1 INJECTION, POWDER, LYOPHILIZED, FOR SOLUTION INTRAVENOUS at 22:53

## 2019-10-23 RX ADMIN — KETOROLAC TROMETHAMINE SCH MG: 30 INJECTION, SOLUTION INTRAMUSCULAR at 21:19

## 2019-10-23 RX ADMIN — PIPERACILLIN AND TAZOBACTAM SCH MLS/HR: 3; .375 INJECTION, POWDER, LYOPHILIZED, FOR SOLUTION INTRAVENOUS; PARENTERAL at 21:19

## 2019-10-23 RX ADMIN — FAMOTIDINE SCH MG: 20 TABLET, FILM COATED ORAL at 21:19

## 2019-10-23 NOTE — OPERATIVE REPORT
Nonrecallable Operative Report


DATE OF SURGERY: 10/23/19


PREOPERATIVE DIAGNOSIS: Left upper extremity abscess


POSTOPERATIVE DIAGNOSIS: Same as above


OPERATION: Incision and drainage of a left antecubital fossa abscess.


SURGEON: CLARE GUTIERREZ


ANESTHESIA: GA


TISSUE REMOVED OR ALTERED: Wound culture


COMPLICATIONS: 





None apparent


ESTIMATED BLOOD LOSS: Minimal


PROCEDURE: 





Drains/implants: 4 x 4 soaked in Betadine.





Procedure in detail: After informed consent was obtained, the patient was 

brought into the operating room and laid in the supine position.  The area of 

the left upper extremity was prepped and draped in a normal sterile fashion.  An

incision was created over the area of maximal fluctuance, immediately superior 

to the upper arm crease, with a 15 blade scalpel.  A large cavity full of foul-

smelling, purulent material was encountered.  The abscess cavity was 

approximately 4 cm in total diameter.  The abscess extended to the level of the 

fascia of the arm.  The abscess cavity was vigorously cleaned and suctioned.  

Multiple loculations were broken.  The fascia was incised to ensure that no 

infection extended beneath the fascial layer.  The muscle was clean and healthy,

without sign of ongoing infection.  The wound was then packed with 4 x 4's 

soaked in Betadine.  A dressing was placed, and the procedure was concluded.  

All sponge, instrument, and needle counts were correct x2.

## 2019-10-23 NOTE — PDOC H&P
History of Present Illness


Admission Date/PCP: 


  10/23/19 13:13





  





Patient complains of: left arm swelling, pain, and paresthesias


History of Present Illness: 


SERGEY SMART is a 34 year old female injecting methamphetamine into 

her left arm approximately 6 days ago. She began to have swelling and pain. It 

has progressed over the last few days, and is now excruciating. She reports 

increased edema, erythema, and now induration. She denies any drainage. She 

reports subjective fevers and chills, but denies CP, SOB, N/V, dizziness, 

orthostasis, blurry vision, sore throat. Palpation and movement make her pain 

worse. Nothing makes it better. Over the last hour, she has started to 

experience a tingling sensation in her fingers. She denies any difficulty with 

movement.








Past Medical History


Psychiatric Medical History: Reports: Attention Deficit Hyperactivity Disorder, 

Bipolar Disorder, Depression





Past Surgical History


Past Surgical History: Reports: None





Social History


Smoking Status: Current Every Day Smoker


Hx Recreational Drug Use: Yes


Drugs: Other - methamphetamine





Family History


Family History: Arthritis, CAD, CVA, DM, Hyperlipidemia, Hypertension, 

Malignancy.  denies: COPD, Thyroid Disfunction


Parental Family History Reviewed: Yes


Children Family History Reviewed: Yes


Sibling(s) Family History Reviewed.: Yes





Medication/Allergy


Allergies/Adverse Reactions: 


                                        





No Known Allergies Allergy (Verified 10/23/19 08:40)


   











Review of Systems


Constitutional: PRESENT: chills, fever(s).  ABSENT: anorexia, fatigue, 

headache(s), night sweats, weakness


Eyes: ABSENT: visual disturbances


Ears: ABSENT: hearing changes


Nose, Mouth, and Throat: ABSENT: mouth pain, sore throat


Cardiovascular: ABSENT: chest pain


Respiratory: ABSENT: cough, dyspnea


Gastrointestinal: ABSENT: abdominal pain, melena, nausea, vomiting


Genitourinary: ABSENT: dysuria


Musculoskeletal: PRESENT: other - pain, swelling of left AC fossa..  ABSENT: 

back pain


Integumentary: PRESENT: erythema


Neurological: ABSENT: confusion, convulsions, dizziness


Psychiatric: ABSENT: anxiety, depression


Endocrine: ABSENT: cold intolerance, heat intolerance


Hematologic/Lymphatic: ABSENT: easy bleeding, easy bruising





Physical Exam


Vital Signs: 


                                        











Temp Pulse Resp BP Pulse Ox


 


 98.4 F   86   15   123/69    


 


 10/23/19 07:30  10/23/19 07:30  10/23/19 07:30  10/23/19 07:30   








                                 Intake & Output











 10/22/19 10/23/19 10/24/19





 06:59 06:59 06:59


 


Intake Total   1000


 


Balance   1000


 


Weight   99.79 kg











General appearance: PRESENT: no acute distress


Head exam: PRESENT: atraumatic, normocephalic


Eye exam: PRESENT: EOMI, PERRLA.  ABSENT: scleral icterus


Mouth exam: PRESENT: moist, neck supple


Teeth exam: ABSENT: poor dentation


Neck exam: ABSENT: meningismus, tenderness, thyromegaly, tracheal deviation


Respiratory exam: PRESENT: clear to auscultation terry, unlabored.  ABSENT: chest 

wall tenderness, wheezes


Cardiovascular exam: PRESENT: RRR


Pulses: PRESENT: normal radial pulses


Vascular exam: PRESENT: other - no physical exam evidence of pseudoaneurysm of 

the left arm


GI/Abdominal exam: PRESENT: soft.  ABSENT: distended, tenderness


Rectal exam: PRESENT: deferred


Extremities exam: PRESENT: other - erythema, induration, edema of left AC fossa.

sensation appears grossly intact. Motor function grossly in-tact.


Musculoskeletal exam: PRESENT: other - see extremity exam


Neurological exam: PRESENT: alert, awake, oriented to person, oriented to place,

oriented to time, oriented to situation, CN II-XII grossly intact


Psychiatric exam: PRESENT: anxious.  ABSENT: agitated, depressed


Focused psych exam: ABSENT: delusional


Skin exam: PRESENT: erythema.  ABSENT: cyanosis, jaundice





Results


Laboratory Results: 


                                        





                                 10/23/19 08:24 





                                 10/23/19 08:24 





                                        











  10/23/19 10/23/19





  08:24 08:24


 


WBC  9.4 


 


RBC  4.60 


 


Hgb  12.9 


 


Hct  38.0 


 


MCV  83 


 


MCH  27.9 


 


MCHC  33.8 


 


RDW  14.4 H 


 


Plt Count  247 


 


Seg Neutrophils %  76.4 


 


Sodium   140.5


 


Potassium   4.0


 


Chloride   104


 


Carbon Dioxide   25


 


Anion Gap   12


 


BUN   13


 


Creatinine   0.54


 


Est GFR (African Amer)   > 60


 


Glucose   104


 


Calcium   9.5


 


Total Bilirubin   0.5


 


AST   31


 


Alkaline Phosphatase   106


 


Total Protein   7.7


 


Albumin   4.0











Impressions: 


                                        





Extremity Ultrasound  10/23/19 07:41


IMPRESSION:  2.8 x 1.8 x 1.8 complex fluid collection in the left antecubital 

fossa most consistent with abscess.


 














Assessment & Plan





- Diagnosis


(1) Abscess of left arm


Is this a current diagnosis for this admission?: Yes   





- Plan Summary


Plan Summary: 





35 y/o F with a deep seated abscess of the left upper extremity. She has 

tenderness with motion. She reports that over the last hour she has begun to 

experience paresthesias. I have recommended urgent/emergent exploration with 

drainage of her deep-space abscess. She has agreed to this. Risks/benefits 

discussed, informed consent obtained, and all questions answered.

## 2019-10-23 NOTE — RADIOLOGY REPORT (SQ)
EXAM DESCRIPTION:  U/S EXTREMITY NONVASCULAR LTD



COMPLETED DATE/TIME:  10/23/2019 9:49 am



REASON FOR STUDY:  left antecub fossa erythema/induration/?abscess



COMPARISON:  None.



TECHNIQUE:  Dynamic and static grayscale images acquired of the localized site of clinical concern an
d recorded on PACS. Additional selected color Doppler and spectral images recorded.

SITE OF CONCERN: Left antecubital fossa



LIMITATIONS:  None.



FINDINGS:  SKIN AND SUBCUTANEOUS TISSUES: There is a focal 2.8 x 1.8 x 1.8 cm complex fluid collectio
n.  Most likely small abscess.

DEEP SOFT TISSUES/MUSCLES: No masses.  No fluid collections. No edema.

VASCULAR: No increased or decreased vascularity.  No occlusions.

OTHER: No other significant finding.



IMPRESSION:  2.8 x 1.8 x 1.8 complex fluid collection in the left antecubital fossa most consistent w
ith abscess.



TECHNICAL DOCUMENTATION:  JOB ID:  [id number]

 2011 [employer]- All Rights Reserved



Reading location - IP/workstation name: [facility]

## 2019-10-23 NOTE — ER DOCUMENT REPORT
ED General





- General


Chief Complaint: Abscess


Stated Complaint: POSSIBLE ABSCESS


Time Seen by Provider: 10/23/19 07:37


Mode of Arrival: Ambulatory


Information source: Patient


TRAVEL OUTSIDE OF THE [place] IN LAST 30 DAYS: No





- HPI


Notes: 





Patient presents with significant left arm swelling and pain.  She states is 

been going on for 4 to 5 days.  She does have a history of IV drug use.  She 

states she last used this morning.  She states the left arm is severely painful.

 It is burning in sensation.  It is constant.  It is worse with movement and 

better when it is held still.  No chest pain.  No cough or congestion.  She has 

had some subjective fever and chills as well as weakness.  Patient has had MRSA 

abscesses in the past.  The pain does radiate throughout her arm.





- Related Data


Allergies/Adverse Reactions: 


                                        





No Known Allergies Allergy (Verified 10/23/19 08:40)


   











Past Medical History





- General


Information source: Patient





- Social History


Smoking Status: Current Every Day Smoker


Frequency of alcohol use: Occasional


Drug Abuse: Other - Patient admits to IV drug use


Family History: Arthritis, CAD, CVA, DM, Hyperlipidemia, Hypertension, 

Malignancy.  denies: COPD, Thyroid Disfunction


Patient has suicidal ideation: No


Patient has homicidal ideation: No


Renal/ Medical History: Denies: Hx Peritoneal Dialysis


Musculoskeletal Medical History: Reports Hx Musculoskeletal Trauma


Skin Medical History: Reports Hx MRSA - UNSURE, RECENT LABIA I & D


Psychiatric Medical History: Reports: Hx Attention Deficit Hyperactivity 

Disorder, Hx Bipolar Disorder, Hx Borderline Personality Disorder, Hx Depression


Traumatic Medical History: Reports: Hx Fractures - Arm





- Immunizations


Immunizations up to date: Yes


Hx Diphtheria, Pertussis, Tetanus Vaccination: No





Review of Systems





- Review of Systems


Constitutional: Chills, Fever, Malaise, Weakness


Cardiovascular: denies: Chest pain, Palpitations


Respiratory: denies: Cough, Short of breath


Gastrointestinal: denies: Abdominal pain, Diarrhea, Vomiting


-: Yes All other systems reviewed and negative





Physical Exam





- Vital signs


Vitals: 





                                        











Temp Pulse Resp BP


 


 98.4 F   86   15   123/69 


 


 10/23/19 07:30  10/23/19 07:30  10/23/19 07:30  10/23/19 07:30











Interpretation: Normal





- General


General appearance: Appears well, Alert


In distress: None





- HEENT


Head: Normocephalic, Atraumatic


Eyes: Normal


Pupils: PERRL





- Respiratory


Respiratory status: No respiratory distress


Chest status: Nontender


Breath sounds: Normal


Chest palpation: Normal





- Cardiovascular


Rhythm: Regular


Heart sounds: Normal auscultation


Murmur: No





- Abdominal


Inspection: Normal


Distension: No distension


Bowel sounds: Normal


Tenderness: Nontender


Organomegaly: No organomegaly





- Back


Back: Normal, Nontender





- Extremities


General upper extremity: Other - Upper extremity exam is remarkable for a left 

upper extremity that is swollen mainly about the anti-cubital fossa.  It is very

tender in this area and erythematous.  It is fluctuant consistent with an 

abscess.


General lower extremity: Normal inspection, Nontender, Normal color, Normal ROM,

Normal temperature, Normal weight bearing.  No: Janeth's sign





- Neurological


Neuro grossly intact: Yes


Cognition: Normal


Orientation: AAOx4


Maud Coma Scale Eye Opening: Spontaneous


Jess Coma Scale Verbal: Oriented


Maud Coma Scale Motor: Obeys Commands


Jess Coma Scale Total: 15


Speech: Normal


Motor strength normal: LUE, RUE, LLE, RLE


Sensory: Normal





- Psychological


Associated symptoms: Normal affect, Normal mood





- Skin


Skin Temperature: Warm


Skin Moisture: Dry


Skin Color: Normal





Course





- Re-evaluation


Re-evalutation: 





10/23/19 13:01


Ultrasound shows a deep abscess in the left arm.  Surgery has evaluated the 

patient will take her to the operating room.





- Vital Signs


Vital signs: 





                                        











Temp Pulse Resp BP Pulse Ox


 


 98.4 F   86   15   123/69    


 


 10/23/19 07:30  10/23/19 07:30  10/23/19 07:30  10/23/19 07:30   














- Laboratory


Result Diagrams: 


                                 10/23/19 08:24





                                 10/23/19 08:24


Laboratory results interpreted by me: 





                                        











  10/23/19





  08:24


 


RDW  14.4 H


 


Lymph % (Auto)  11.1 L














- Diagnostic Test


Radiology reviewed: Image reviewed, Reports reviewed





Discharge





- Discharge


Clinical Impression: 


 Abscess of left arm





Condition: Stable


Disposition: ADMITTED AS INPATIENT


Admitting Provider: Surgicalist - [facility]


Unit Admitted: OR

## 2019-10-24 LAB
ADD MANUAL DIFF: NO
BASOPHILS # BLD AUTO: 0 10^3/UL (ref 0–0.2)
BASOPHILS NFR BLD AUTO: 0.4 % (ref 0–2)
EOSINOPHIL # BLD AUTO: 0.3 10^3/UL (ref 0–0.6)
EOSINOPHIL NFR BLD AUTO: 3.5 % (ref 0–6)
ERYTHROCYTE [DISTWIDTH] IN BLOOD BY AUTOMATED COUNT: 14.7 % (ref 11.5–14)
HCT VFR BLD CALC: 34.7 % (ref 36–47)
HGB BLD-MCNC: 11.7 G/DL (ref 12–15.5)
LYMPHOCYTES # BLD AUTO: 1.2 10^3/UL (ref 0.5–4.7)
LYMPHOCYTES NFR BLD AUTO: 12 % (ref 13–45)
MCH RBC QN AUTO: 28 PG (ref 27–33.4)
MCHC RBC AUTO-ENTMCNC: 33.7 G/DL (ref 32–36)
MCV RBC AUTO: 83 FL (ref 80–97)
MONOCYTES # BLD AUTO: 0.8 10^3/UL (ref 0.1–1.4)
MONOCYTES NFR BLD AUTO: 8.4 % (ref 3–13)
NEUTROPHILS # BLD AUTO: 7.3 10^3/UL (ref 1.7–8.2)
NEUTS SEG NFR BLD AUTO: 75.7 % (ref 42–78)
PLATELET # BLD: 184 10^3/UL (ref 150–450)
RBC # BLD AUTO: 4.17 10^6/UL (ref 3.72–5.28)
TOTAL CELLS COUNTED % (AUTO): 100 %
WBC # BLD AUTO: 9.6 10^3/UL (ref 4–10.5)

## 2019-10-24 RX ADMIN — FLUOXETINE SCH: 20 CAPSULE ORAL at 10:58

## 2019-10-24 RX ADMIN — FLUOXETINE SCH MG: 20 CAPSULE ORAL at 09:42

## 2019-10-24 RX ADMIN — MORPHINE SULFATE PRN MG: 10 INJECTION INTRAMUSCULAR; INTRAVENOUS; SUBCUTANEOUS at 10:54

## 2019-10-24 RX ADMIN — MORPHINE SULFATE PRN MG: 10 INJECTION INTRAMUSCULAR; INTRAVENOUS; SUBCUTANEOUS at 05:32

## 2019-10-24 RX ADMIN — KETOROLAC TROMETHAMINE SCH MG: 30 INJECTION, SOLUTION INTRAMUSCULAR at 05:33

## 2019-10-24 RX ADMIN — MORPHINE SULFATE PRN MG: 10 INJECTION INTRAMUSCULAR; INTRAVENOUS; SUBCUTANEOUS at 01:28

## 2019-10-24 RX ADMIN — PIPERACILLIN AND TAZOBACTAM SCH MLS/HR: 3; .375 INJECTION, POWDER, LYOPHILIZED, FOR SOLUTION INTRAVENOUS; PARENTERAL at 20:13

## 2019-10-24 RX ADMIN — KETOROLAC TROMETHAMINE SCH MG: 30 INJECTION, SOLUTION INTRAMUSCULAR at 13:19

## 2019-10-24 RX ADMIN — MORPHINE SULFATE PRN MG: 10 INJECTION INTRAMUSCULAR; INTRAVENOUS; SUBCUTANEOUS at 17:35

## 2019-10-24 RX ADMIN — FAMOTIDINE SCH MG: 20 TABLET, FILM COATED ORAL at 09:42

## 2019-10-24 RX ADMIN — VANCOMYCIN HYDROCHLORIDE SCH MLS/HR: 1 INJECTION, POWDER, LYOPHILIZED, FOR SOLUTION INTRAVENOUS at 22:56

## 2019-10-24 RX ADMIN — VANCOMYCIN HYDROCHLORIDE SCH MLS/HR: 1 INJECTION, POWDER, LYOPHILIZED, FOR SOLUTION INTRAVENOUS at 05:32

## 2019-10-24 RX ADMIN — KETOROLAC TROMETHAMINE SCH MG: 30 INJECTION, SOLUTION INTRAMUSCULAR at 22:57

## 2019-10-24 RX ADMIN — FAMOTIDINE SCH MG: 20 TABLET, FILM COATED ORAL at 22:56

## 2019-10-24 RX ADMIN — HYDROCODONE BITARTRATE AND ACETAMINOPHEN PRN TAB: 10; 325 TABLET ORAL at 03:02

## 2019-10-24 RX ADMIN — PIPERACILLIN AND TAZOBACTAM SCH MLS/HR: 3; .375 INJECTION, POWDER, LYOPHILIZED, FOR SOLUTION INTRAVENOUS; PARENTERAL at 08:15

## 2019-10-24 RX ADMIN — PIPERACILLIN AND TAZOBACTAM SCH MLS/HR: 3; .375 INJECTION, POWDER, LYOPHILIZED, FOR SOLUTION INTRAVENOUS; PARENTERAL at 02:35

## 2019-10-24 RX ADMIN — PIPERACILLIN AND TAZOBACTAM SCH MLS/HR: 3; .375 INJECTION, POWDER, LYOPHILIZED, FOR SOLUTION INTRAVENOUS; PARENTERAL at 15:10

## 2019-10-24 RX ADMIN — VANCOMYCIN HYDROCHLORIDE SCH MLS/HR: 1 INJECTION, POWDER, LYOPHILIZED, FOR SOLUTION INTRAVENOUS at 13:19

## 2019-10-24 RX ADMIN — HYDROCODONE BITARTRATE AND ACETAMINOPHEN PRN TAB: 10; 325 TABLET ORAL at 20:12

## 2019-10-24 NOTE — PDOC PROGRESS REPORT
Subjective


Progress Note for:: 10/24/19


Subjective:: 





pains left arm I&D site


Reason For Visit: 


LEFT UPPER EXTREMITY ABSCESS








Physical Exam


Vital Signs: 


                                        











Temp Pulse Resp BP Pulse Ox


 


 100.0 F   105 H  16   141/71 H  100 


 


 10/24/19 20:40  10/24/19 20:40  10/24/19 20:40  10/24/19 20:40  10/24/19 20:40








                                 Intake & Output











 10/23/19 10/24/19 10/25/19





 06:59 06:59 06:59


 


Intake Total  2190 2346


 


Output Total  5 


 


Balance  2185 2346


 


Weight  103.2 kg 











Exam: 





Packing was removed from the left arm.  The wound looks clean and dry.


Packing using iodoform gauze was placed.





Results


Laboratory Results: 


                                        





                                 10/24/19 06:24 





                                 10/23/19 08:24 





                                        











  10/24/19





  06:24


 


WBC  9.6


 


RBC  4.17


 


Hgb  11.7 L


 


Hct  34.7 L


 


MCV  83


 


MCH  28.0


 


MCHC  33.7


 


RDW  14.7 H


 


Plt Count  184


 


Seg Neutrophils %  75.7











Impressions: 


                                        





Extremity Ultrasound  10/23/19 07:41


IMPRESSION:  2.8 x 1.8 x 1.8 complex fluid collection in the left antecubital 

fossa most consistent with abscess.


 














Assessment & Plan





- Diagnosis


(1) Abscess of left arm


Is this a current diagnosis for this admission?: Yes   





- Time


Time Spent with patient: 15-24 minutes





- Inpatient Certification


Medical Necessity: Need for Pain Control, Need for IV Antibiotics





- Plan Summary


Plan Summary: 





Plans:


Continue IV antibiotics for 24 to 48 hours.


Follow-up culture and sensitivity

## 2019-10-25 VITALS — DIASTOLIC BLOOD PRESSURE: 69 MMHG | SYSTOLIC BLOOD PRESSURE: 123 MMHG

## 2019-10-25 LAB — VANCOMYCIN,TROUGH: 11.7 UG/ML (ref 5–20)

## 2019-10-25 RX ADMIN — PIPERACILLIN AND TAZOBACTAM SCH MLS/HR: 3; .375 INJECTION, POWDER, LYOPHILIZED, FOR SOLUTION INTRAVENOUS; PARENTERAL at 08:22

## 2019-10-25 RX ADMIN — VANCOMYCIN HYDROCHLORIDE SCH MLS/HR: 1 INJECTION, POWDER, LYOPHILIZED, FOR SOLUTION INTRAVENOUS at 06:23

## 2019-10-25 RX ADMIN — PIPERACILLIN AND TAZOBACTAM SCH MLS/HR: 3; .375 INJECTION, POWDER, LYOPHILIZED, FOR SOLUTION INTRAVENOUS; PARENTERAL at 02:46

## 2019-10-25 RX ADMIN — MORPHINE SULFATE PRN MG: 10 INJECTION INTRAMUSCULAR; INTRAVENOUS; SUBCUTANEOUS at 02:46

## 2019-10-25 RX ADMIN — KETOROLAC TROMETHAMINE SCH MG: 30 INJECTION, SOLUTION INTRAMUSCULAR at 06:22

## 2020-03-04 ENCOUNTER — HOSPITAL ENCOUNTER (EMERGENCY)
Dept: HOSPITAL 62 - ER | Age: 35
Discharge: HOME | End: 2020-03-04
Payer: MEDICAID

## 2020-03-04 VITALS — SYSTOLIC BLOOD PRESSURE: 109 MMHG | DIASTOLIC BLOOD PRESSURE: 72 MMHG
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
## 2020-03-04 DIAGNOSIS — S02.2XXA: Primary | ICD-10-CM
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
## 2020-03-04 PROCEDURE — 70486 CT MAXILLOFACIAL W/O DYE: CPT

## 2020-03-04 PROCEDURE — 99284 EMERGENCY DEPT VISIT MOD MDM: CPT
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
## 2020-12-09 ENCOUNTER — HOSPITAL ENCOUNTER (EMERGENCY)
Dept: HOSPITAL 62 - ER | Age: 35
Discharge: HOME | End: 2020-12-09
Payer: MEDICAID

## 2020-12-09 VITALS — SYSTOLIC BLOOD PRESSURE: 122 MMHG | DIASTOLIC BLOOD PRESSURE: 70 MMHG

## 2020-12-09 DIAGNOSIS — F10.10: ICD-10-CM

## 2020-12-09 DIAGNOSIS — F19.10: Primary | ICD-10-CM

## 2020-12-09 PROCEDURE — 99282 EMERGENCY DEPT VISIT SF MDM: CPT

## 2020-12-09 NOTE — ER DOCUMENT REPORT
HPI





- HPI


Time Seen by Provider: 12/09/20 13:42


Context: 





CHIEF COMPLAINT: Medical clearance





HPI: 35-year-old female with history of heroin methamphetamine and opiate and 

alcohol abuse presenting for medical clearance.  Requested to see the patient by

the  as patient has forms that need to be signed in order for her 

to get placement in a special rehab.  Patient states she has been in rehabs 

intermittently since July has been clean since July.  Patient was most recently 

at [facility] for treatment.  Patient denies any medical problems denies any allergies.

 She denies any physical complaints at this time.  She expresses an interest in 

further treatment to make sure that she stays clean





ROS: See HPI - all other systems were reviewed and are otherwise negative


Constitutional: no fever 


Eyes: no drainage, no blurred vision


ENT: no runny nose, no sore throat


Cardiovascular:  no chest pain 


Resp: no SOB, no cough


GI: no vomiting, no diarrhea, no abdominal pain


: no dysuria


Integumentary: no rash 


Allergy: no hives 


Musculoskeletal: no extremity pain or swelling 


Neurological: no numbness/tingling, no weakness





MEDICATIONS: I agree with the patient medications as charted by the RN.





ALLERGIES: I agree with the allergies as charted by the RN.





PAST MEDICAL HISTORY/PAST SURGICAL HISTORY: Reviewed and agree as charted by RN.





SOCIAL HISTORY: Reviewed and agree as charted by RN.





FAMILY HISTORY: No significant familial comorbid conditions directly related to 

patient complaint





EXAM:


Reviewed vital signs as charted by RN.


CONSTITUTIONAL: Alert and oriented and responds appropriately to questions. 

Well-appearing; well-nourished


HEAD: Normocephalic; atraumatic


EYES: PERRL; Conjunctivae clear, sclerae non-icteric


ENT: normal nose; no rhinorrhea; moist mucous membranes; pharynx without lesions

noted, no uvula edema or deviation, no tonsillar hypertrophy, phonation normal


NECK: Supple without meningismus; non-tender; no cervical lymphadenopathy, no 

masses


CARD: RRR; no murmurs, no clicks, no rubs, no gallops; symmetric distal pulses


RESP: Normal chest excursion without splinting or tachypnea; breath sounds clear

and equal bilaterally; no wheezes, no rhonchi, no rales, pulse oximetry 97% on 

room air not hypoxic


ABD/GI: Normal bowel sounds; non-distended; soft, non-tender, no rebound, no 

guarding; no palpable organomegaly or masses.


BACK:  The back appears normal and is non-tender to palpation, there is no CVA 

tenderness


EXT: Normal ROM in all joints; non-tender to palpation; no cyanosis, no 

effusions, no edema   


SKIN: Normal color for age and race; warm; dry; good turgor; no acute lesions 

noted


NEURO: Moves all extremities equally; Motor and sensory function intact 


PSYCH: The patient's mood and manner are appropriate. Grooming and personal 

hygiene are appropriate.





MDM: 35-year-old female presenting for medical clearance for continuing 

treatment for drug and alcohol abuse.  Patient relates that she has been clean 

since July.  She recently was at [facility] and was discharged yesterday.  Patient has 

several medical forms related to medication she might take at a rehab but she is

on no prescribed medications at this time.  She may take over-the-counter's.  

She has no physical complaints at this time and a normal general physical exam. 

She expresses a significant interest in further treatment options so that she 

may stay clean.  Case was discussed with Dr. Mead attending





- REPRODUCTIVE


Reproductive: DENIES: Pregnant:





Past Medical History





- Social History


Smoking Status: Unknown if Ever Smoked


Family History: Arthritis, CAD, CVA, DM, Hyperlipidemia, Hypertension, 

Malignancy.  denies: COPD, Thyroid Disfunction


Renal/ Medical History: Denies: Hx Peritoneal Dialysis


Musculoskeletal Medical History: Reports Hx Musculoskeletal Trauma


Skin Medical History: Reports Hx MRSA - UNSURE, RECENT LABIA I & D


Psychiatric Medical History: Reports: Hx Attention Deficit Hyperactivity 

Disorder, Hx Bipolar Disorder, Hx Borderline Personality Disorder, Hx Depression


Traumatic Medical History: Reports: Hx Fractures - Arm





- Immunizations


Immunizations up to date: Yes


Hx Diphtheria, Pertussis, Tetanus Vaccination: No





Vertical Provider Document





- INFECTION CONTROL


TRAVEL OUTSIDE OF THE [place] IN LAST 30 DAYS: No





Course





- Laboratory Results


Critical Laboratory Results Reviewed: No Critical Results





- Radiology Results


Critical Radiology Results Reviewed: No Critical Results





Discharge





- Discharge


Clinical Impression: 


 Drug abuse





Condition: Stable


Disposition: HOME, SELF-CARE


Additional Instructions: 


Follow-up with your rehab facility for further management and treatment.  Good 

luck in the future and congratulations on taking positive steps